# Patient Record
Sex: MALE | Race: WHITE | NOT HISPANIC OR LATINO | Employment: OTHER | URBAN - METROPOLITAN AREA
[De-identification: names, ages, dates, MRNs, and addresses within clinical notes are randomized per-mention and may not be internally consistent; named-entity substitution may affect disease eponyms.]

---

## 2019-07-02 ENCOUNTER — HISTORICAL (OUTPATIENT)
Dept: PREADMISSION TESTING | Facility: HOSPITAL | Age: 64
End: 2019-07-02

## 2019-07-02 LAB
ABS NEUT (OLG): 3.98 X10(3)/MCL (ref 2.1–9.2)
ALBUMIN SERPL-MCNC: 4.2 GM/DL (ref 3.4–5)
ALBUMIN/GLOB SERPL: 1.1 RATIO (ref 1.1–2)
ALP SERPL-CCNC: 77 UNIT/L (ref 50–136)
ALT SERPL-CCNC: 29 UNIT/L (ref 12–78)
APTT PPP: 40.7 SECOND(S) (ref 24.2–33.9)
AST SERPL-CCNC: 22 UNIT/L (ref 15–37)
BASOPHILS # BLD AUTO: 0.1 X10(3)/MCL (ref 0–0.2)
BASOPHILS NFR BLD AUTO: 2 %
BILIRUB SERPL-MCNC: 0.5 MG/DL (ref 0.2–1)
BILIRUBIN DIRECT+TOT PNL SERPL-MCNC: 0.2 MG/DL (ref 0–0.5)
BILIRUBIN DIRECT+TOT PNL SERPL-MCNC: 0.3 MG/DL (ref 0–0.8)
BUN SERPL-MCNC: 19 MG/DL (ref 7–18)
CALCIUM SERPL-MCNC: 9.3 MG/DL (ref 8.5–10.1)
CHLORIDE SERPL-SCNC: 103 MMOL/L (ref 98–107)
CO2 SERPL-SCNC: 30 MMOL/L (ref 21–32)
CREAT SERPL-MCNC: 1.19 MG/DL (ref 0.7–1.3)
EOSINOPHIL # BLD AUTO: 0.4 X10(3)/MCL (ref 0–0.9)
EOSINOPHIL NFR BLD AUTO: 6 %
ERYTHROCYTE [DISTWIDTH] IN BLOOD BY AUTOMATED COUNT: 13.6 % (ref 11.5–17)
GLOBULIN SER-MCNC: 3.7 GM/DL (ref 2.4–3.5)
GLUCOSE SERPL-MCNC: 87 MG/DL (ref 74–106)
HCT VFR BLD AUTO: 46.3 % (ref 42–52)
HGB BLD-MCNC: 14.6 GM/DL (ref 14–18)
INR PPP: 1.8 (ref 0–1.3)
LYMPHOCYTES # BLD AUTO: 1.4 X10(3)/MCL (ref 0.6–4.6)
LYMPHOCYTES NFR BLD AUTO: 20 %
MCH RBC QN AUTO: 27.7 PG (ref 27–31)
MCHC RBC AUTO-ENTMCNC: 31.5 GM/DL (ref 33–36)
MCV RBC AUTO: 87.7 FL (ref 80–94)
MONOCYTES # BLD AUTO: 0.9 X10(3)/MCL (ref 0.1–1.3)
MONOCYTES NFR BLD AUTO: 13 %
NEUTROPHILS # BLD AUTO: 3.98 X10(3)/MCL (ref 2.1–9.2)
NEUTROPHILS NFR BLD AUTO: 59 %
PLATELET # BLD AUTO: 171 X10(3)/MCL (ref 130–400)
PMV BLD AUTO: 11.6 FL (ref 9.4–12.4)
POTASSIUM SERPL-SCNC: 4.8 MMOL/L (ref 3.5–5.1)
PROT SERPL-MCNC: 7.9 GM/DL (ref 6.4–8.2)
PROTHROMBIN TIME: 21.4 SECOND(S) (ref 12–14)
RBC # BLD AUTO: 5.28 X10(6)/MCL (ref 4.7–6.1)
SODIUM SERPL-SCNC: 138 MMOL/L (ref 136–145)
WBC # SPEC AUTO: 6.8 X10(3)/MCL (ref 4.5–11.5)

## 2019-07-10 ENCOUNTER — HISTORICAL (OUTPATIENT)
Dept: ADMINISTRATIVE | Facility: HOSPITAL | Age: 64
End: 2019-07-10

## 2019-07-10 ENCOUNTER — HISTORICAL (OUTPATIENT)
Dept: PREADMISSION TESTING | Facility: HOSPITAL | Age: 64
End: 2019-07-10

## 2019-07-10 LAB
INR PPP: 1.1 (ref 0–1.3)
PROTHROMBIN TIME: 14.2 SECOND(S) (ref 12–14)

## 2020-07-27 LAB
ABS NEUT (OLG): 4.02 X10(3)/MCL (ref 2.1–9.2)
BASOPHILS # BLD AUTO: 0.1 X10(3)/MCL (ref 0–0.2)
BASOPHILS NFR BLD AUTO: 1 %
EOSINOPHIL # BLD AUTO: 0.5 X10(3)/MCL (ref 0–0.9)
EOSINOPHIL NFR BLD AUTO: 8 %
ERYTHROCYTE [DISTWIDTH] IN BLOOD BY AUTOMATED COUNT: 12.8 % (ref 11.5–17)
HCT VFR BLD AUTO: 45.8 % (ref 42–52)
HGB BLD-MCNC: 14.7 GM/DL (ref 14–18)
LYMPHOCYTES # BLD AUTO: 1.3 X10(3)/MCL (ref 0.6–4.6)
LYMPHOCYTES NFR BLD AUTO: 20 %
MCH RBC QN AUTO: 28.2 PG (ref 27–31)
MCHC RBC AUTO-ENTMCNC: 32.1 GM/DL (ref 33–36)
MCV RBC AUTO: 87.9 FL (ref 80–94)
MONOCYTES # BLD AUTO: 0.6 X10(3)/MCL (ref 0.1–1.3)
MONOCYTES NFR BLD AUTO: 10 %
NEUTROPHILS # BLD AUTO: 4.02 X10(3)/MCL (ref 2.1–9.2)
NEUTROPHILS NFR BLD AUTO: 61 %
PLATELET # BLD AUTO: 156 X10(3)/MCL (ref 130–400)
PMV BLD AUTO: 11.3 FL (ref 9.4–12.4)
RBC # BLD AUTO: 5.21 X10(6)/MCL (ref 4.7–6.1)
WBC # SPEC AUTO: 6.6 X10(3)/MCL (ref 4.5–11.5)

## 2020-07-30 ENCOUNTER — HISTORICAL (OUTPATIENT)
Dept: SURGERY | Facility: HOSPITAL | Age: 65
End: 2020-07-30

## 2020-07-30 LAB
APTT PPP: 32.3 SECOND(S) (ref 23.2–33.7)
INR PPP: 1.2 (ref 0–1.3)
PROTHROMBIN TIME: 14.5 SECOND(S) (ref 11.1–13.7)

## 2021-03-29 ENCOUNTER — HISTORICAL (OUTPATIENT)
Dept: ADMINISTRATIVE | Facility: HOSPITAL | Age: 66
End: 2021-03-29

## 2021-04-29 ENCOUNTER — HISTORICAL (OUTPATIENT)
Dept: ADMINISTRATIVE | Facility: HOSPITAL | Age: 66
End: 2021-04-29

## 2021-06-07 ENCOUNTER — HISTORICAL (OUTPATIENT)
Dept: ADMINISTRATIVE | Facility: HOSPITAL | Age: 66
End: 2021-06-07

## 2021-07-29 ENCOUNTER — HISTORICAL (OUTPATIENT)
Dept: ADMINISTRATIVE | Facility: HOSPITAL | Age: 66
End: 2021-07-29

## 2021-10-07 ENCOUNTER — HISTORICAL (OUTPATIENT)
Dept: ADMINISTRATIVE | Facility: HOSPITAL | Age: 66
End: 2021-10-07

## 2022-04-10 ENCOUNTER — HISTORICAL (OUTPATIENT)
Dept: ADMINISTRATIVE | Facility: HOSPITAL | Age: 67
End: 2022-04-10
Payer: MEDICARE

## 2022-04-27 VITALS
WEIGHT: 240.06 LBS | SYSTOLIC BLOOD PRESSURE: 100 MMHG | BODY MASS INDEX: 33.61 KG/M2 | DIASTOLIC BLOOD PRESSURE: 68 MMHG | HEIGHT: 71 IN

## 2022-04-30 NOTE — OP NOTE
DATE OF SURGERY:    07/30/2020    SURGEON:  Fady Avalos MD    PREOPERATIVE DIAGNOSIS:  Penile pain with inflatable penile prosthesis.    POSTOPERATIVE DIAGNOSIS:  Penile pain with inflatable penile prosthesis.    PROCEDURE:    1. Removal of 3-piece inflatable penile prosthesis.    2. Placement of inflatable penile prosthesis with Coloplast Titan device.    FINDINGS:  His penile prosthesis was removed and there was no obvious infection.  I did find that the left cylinder had migrated proximally, and down proximally, I could not feel bone with my measuring device, making me think there could have been possibly a proximal perforation that caused pain with inflation.    DESCRIPTION OF PROCEDURE:  After informed consent was obtained, the patient was taken to the operating room.  After receiving preoperative doses of vancomycin gentamicin, he was given a general anesthetic in the supine position.  His abdomen, genitals, perineum, and lower extremities above the knees were all prepped and draped in the usual sterile fashion.  I placed a 16-Australian Monzon catheter through the urethra into the bladder.  I then used a Michael deep scrotal retractor to place tension on the penis.  I used a #15 blade to make a horizontal penoscrotal incision through the previous scar.  I divided the scrotal layers down to the corporal bodies as well as to expose the urethra to prevent any injury.  I dissected the pump out of the pseudocapsule space in the scrotum.  I followed the tubing to the cylinders and the corpora.  Using the cutting current on the cautery, I made corporotomies just above the tubing to the cylinders on both sides.  When this was completed, I placed stay sutures of 2-0 Vicryl on each side into the corpora.  I used a vein retractor to remove the cylinders and the rear-tip extenders on both sides.  I then drained the bladder through the catheter with the aid of the suction device.  I then followed the tubing to the  reservoir space in the left retropubic location.  I removed that and I placed a Yankauer sucker down the pseudocapsule space and washed this with half-strength peroxide, Betadine, and concentrated rifampin and gentamicin.  Using the measuring device, I measured his corpora.  The right side measured 10 cm distally and 14 cm proximally.  On the left side, he measured 10 cm distally and proximally, I could never really find bone.  It seemed to keep going, making me think he had had maybe a perforation or a proximal erosion which caused pain with inflation of his previous device.  I decided to do a windsock repair on that side.  I chose 22 cm Titan cylinders that were appropriately prepped on the back table and soaked in concentrated rifampin and gentamicin.  I placed 2 cm rear-tip extenders on both ends.  On the left side, I placed a 2-0 Prolene stitch x2 on each side of the rear tip extenders for my windsock repair.  Using the Oseas device, I used a needle to pass the strings through the distal corpora and glans on both sides.  I placed both proximal ends into position and pulled the distal ends into position with the strings.  I used the previously placed stay sutures in the corporotomies to close the corporotomies.  Just prior to this on the left side, I brought both ends of the Prolene stitches from my windsock repair through the corpora on both sides.  I held my strings to the distal ends of the cylinders tight and I pumped up both of the cylinders.  I closed the corpora with the Vicryl stitches.  For my windsock repair, I closed my Prolene stitches separately, and then pulled both of them over the corporotomies and I tied them together for a nice hold.  Both tips of the cylinders were very close to the same location.  I then deflated the devices, the retractor was removed.  I then prepped a 75 cc reservoir on the back table.  I soaked it in concentrated rifampin and gentamicin.  I then used a long nasal speculum  to perforate the transversalis fascia above the right pubic tubercle and I made a retroperitoneal space with the speculum and I placed the 75 cc reservoir in that location on the right side.  I then filled it with 75 cc of isotonic saline.  Then using the quick connectors, I connected the reservoir to the pump, which was placed in the left hemiscrotum because he had a previous left orchiectomy.  I then placed concentrated rifampin and washed out the pump and placed some in the scrotum.  I then closed the scrotum in multiple layers using a running 2-0 Vicryl stitch for the dartos fascia, followed by a running 4-0 Monocryl subcuticular stitch for the skin.  He tolerated the procedure well.  The estimated blood loss was 25 cc.  There were no apparent complications.  He was extubated and brought to recovery room in good condition.        ______________________________  MD JL Chaney/QUIN  DD:  07/30/2020  Time:  06:20PM  DT:  07/30/2020  Time:  06:46PM  Job #:  [id number]

## 2022-04-30 NOTE — OP NOTE
DATE OF SURGERY:        SURGEON:  Ag Aden MD    PREOP DIAGNOSIS:  4 x 2 cm basal cell carcinoma of the left neck.    POSTOP DIAGNOSIS:  4 x 2 cm basal cell carcinoma of the left neck.    PROCEDURE:  Wide local excision of basal cell carcinoma of the left neck with primary closure.     __________    ANESTHESIA:  General, IV sedation.    COMPLICATIONS:  None.    CONDITION:  Stable.    HISTORY OF PRESENT ILLNESS:  63-year-old gentleman well known to me.  We biopsied a lesion on his left neck.  It was positive for basal cell carcinoma.  All risks and benefits were explained to the patient in detail and informed consent was obtained prior to proceeding.    PROCEDURE IN DETAIL:  The patient was brought to the operating room and placed supine position.  Once IV sedation had been administered, I injected the area with 10 cc 1% lidocaine with epinephrine.  The patient was prepped, draped in sterile fashion.  I marked at approximately a 5 mm margin.  The entire lesion was incised using a 15 blade and scissors.  Sent for frozen section.  All margins were noted to be negative.  I undermined the defect widely using 4-0 Vicryl to close the deep dermal layer and then closed the skin using a 4-0 chromic in a simple running fashion.  The patient was awoken and brought to recovery room without complications.        ______________________________  Ag Aden MD JD/SC  DD:  07/10/2019  Time:  01:38PM  DT:  07/10/2019  Time:  01:52PM  Job #:  [id number]

## 2022-05-03 NOTE — HISTORICAL [facility] CERNER
This is a historical note converted from Aron. Formatting and pictures may have been removed.  Please reference Aron for original formatting and attached multimedia. Chief Complaint  pt here for f/u R TKA 7/13/21 - 10/11/21 pt reports his knee had not been doing well. he states he fell last week directly on his knee and since has been having problems with it.  History of Present Illness  Patient comes in today follow-up for right total knee replacement with Dr. Hull.  He reports he is doing well until last week he sustained a fall pain he is describing an incident he is not quite sure how his knee was?involved in a fall, he describes it being jammed and then he states he hit the ground and rolled on the knee.  ?  He states his knee has been giving him more pain since the fall and he did not call the office in?talk to anyone until he showed up today for his regular scheduled follow-up appointment.  ?  patient is on coumadin  Review of Systems  Constitutional: No fever, No chills.  Respiratory: No shortness of breath, No cough.  Cardiovascular: No chest pain.  Gastrointestinal: No nausea, No vomiting, No diarrhea, No constipation, No heartburn.  Genitourinary: No dysuria, No hematuria.  Hematology/Lymphatics: No bleeding tendency.  Endocrine: No polyuria.  Neurologic: Alert and oriented X4, No numbness, No tingling.  Psychiatric: No anxiety, No depression.  Integumentary: ?negative except as documented in history of present illness  Physical Exam  Vitals & Measurements  T:?36.9? ?C (Oral)? HR:?65(Peripheral)? BP:?112/67?  HT:?180.00?cm? WT:?108.900?kg? BMI:?33.61?  Right knee exam  Wound healed well without s/s infection  ROM 0-110  Walking with altered gait  Mild/moderate effusion  CMS intact to the right lower extremity  Tenderness to palpation over the quad tendon?at the insertion of the patella.  Patient does have some pain with patella mobilization.  He is able to?perform straight leg raise without  difficulty  He is able to extend the leg against resistance  There is no palpable defect felt of the quad or patella tendon  ? ?  x-rays show intact prosthesis in good position  No acute bony abnormalities noted  Patella well centered in the femoral sulcus  ???  Diagnosis: Status post right total knee arthroplasty  Right knee?contusion  ???  Plan:  At this point the patient will hold his physical therapy.  ?ice treatment to the knee.  He cannot use anti-inflammatories due to his Coumadin?anticoagulation medication.  We will have the patient follow-up with Dr. Araujo next week for repeat evaluation.  Recommend the patient use a cane or other assistive device for ambulation of the meantime  Follow-up in _  Assessment/Plan  1.?Contusion of right knee?S80.01XA  2.?Status post total right knee replacement?Z96.651   Problem List/Past Medical History  Ongoing  Acid reflux  Arthritis  Cardiac pacemaker  Contusion of right knee  Erectile dysfunction  Heart murmur  HLD - Hyperlipidemia  HTN - Hypertension  Paroxysmal atrial fibrillation  Penile pain  Sleep apnea  Status post total right knee replacement  Historical  Hypertension  Procedure/Surgical History  CODEY MARSHALL Total Knee Arthroplasty (Right) (07/13/2021)  Replacement of Right Knee Joint with Synthetic Substitute, Uncemented, Open Approach (07/13/2021)  Robotic Assisted Procedure of Lower Extremity, Open Approach (07/13/2021)  Change Drainage Device in Bladder, External Approach (05/30/2021)  Penile Implant (None) (07/30/2020)  Removal and replacement of all component(s) of a multi-component, inflatable penile prosthesis at the same operative session (07/30/2020)  Removal of Synthetic Substitute from Penis, Open Approach (07/30/2020)  Removal Penile Implant (None) (07/30/2020)  Supplement Penis with Synthetic Substitute, Open Approach (07/30/2020)  penile implant pump exchange with orchiectomy (10.2019)  48005 - EXC MAL LESION 3.1-4cm SNG W/ H&F (Left) (07/10/2019)  25905  - REP INTMD WOUND(S) [facility] 2.6-7.5cm (Left) (07/10/2019)  Excision of Left Neck Subcutaneous Tissue and Fascia, Open Approach (07/10/2019)  Excision, malignant lesion including margins, scalp, neck, hands, feet, genitalia; excised diameter 3.1 to 4.0 cm (07/10/2019)  Repair Left Neck Subcutaneous Tissue and Fascia, Open Approach (07/10/2019)  Repair, intermediate, wounds of neck, hands, feet and/or external genitalia; 2.6 cm to 7.5 cm (07/10/2019)  penile implant (04/18/2019)  Rt CW pacemaker (2016)  Angioplasty  Bilateral ulner nerve decompression  carpal tunnel  cervical decompression  Cholecystectomy  left shoulder bone spur  Left TKR  mitral valve repair  Open heart surgery  ORIF Rt ankle  right index finger   Medications  acetaminophen-oxycodone 325 mg-5 mg oral tablet, 1 tab(s), Oral, q6hr,? ?Not Taking, Completed Rx  aspirin 81 mg oral Delayed Release (EC) tablet, 81 mg= 1 tab(s), Oral, Daily  FUROSEMIDE 40 MG TABLET, 40 mg= 1 tab(s), Oral, Daily  GABAPENTIN 400 MG CAPSULE, 400 mg= 1 cap(s), Oral, BID  LISINOPRIL 2.5 MG TABLET, 2.5 mg= 1 tab(s), Oral, BID  Lovenox 100mg Inj, 100 mg, Subcutaneous, q12hr,? ?Still taking, not as prescribed: Took 1 dose Saturday and Sunday, Instructed to take 1 dose thursday and friday and follow up with Coumadin clinic on Friday  methocarbamol 750 mg oral tablet, 1500 mg= 2 tab(s), Oral, TID  METOPROLOL SUCC ER 50 MG TAB, 50 mg= 1 tab(s), Oral, Daily  Percocet 5/325 oral tablet, 1 tab(s), Oral, q8hr, PRN,? ?Not Taking, Completed Rx  PRAVASTATIN SODIUM 10 MG TAB, 10 mg= 1 tab(s), Oral, qPM  TAMSULOSIN HCL 0.4 MG CAPSULE, 0.4 mg= 1 cap(s), Oral, BID  warfarin 3 mg oral tablet, 6 mg= 2 tab(s), Oral, Daily  Allergies  Adhesive Bandage?(Rash)  statins?(elevated enzymes)  Social History  Abuse/Neglect  No, No, Yes, 10/07/2021  Alcohol - Denies Alcohol Use, 08/01/2013  1-2 times per week, 05/30/2021  Current, Beer, 1-2 times per week, 07/30/2020  Employment/School  Disabled, Highest  education level: High school., 07/30/2020  Spiritual/Cultural  Buddhist, 07/28/2020  Substance Use - Denies Substance Abuse, 08/01/2013  Never, 04/27/2018  Tobacco - Denies Tobacco Use, 08/01/2013  Former smoker, quit more than 30 days ago, N/A, 10/07/2021  Immunizations  Vaccine Date Status   COVID-19 MRNA, LNP-S, PF- Pfizer 05/13/2021 Recorded   COVID-19 MRNA, LNP-S, PF- Pfizer 04/22/2021 Recorded   Health Maintenance  Health Maintenance  ???Pending?(in the next year)  ??? ??Due?  ??? ? ? ?Colorectal Screening due??10/08/21??Unknown Frequency  ??? ? ? ?Hypertension Management-Education due??10/08/21??and every 1??year(s)  ??? ? ? ?Medicare Annual Wellness Exam due??10/08/21??and every 1??year(s)  ??? ? ? ?Tetanus Vaccine due??10/08/21??and every 10??year(s)  ??? ? ? ?Zoster Vaccine due??10/08/21??Unknown Frequency  ??? ??Due In Future?  ??? ? ? ?Obesity Screening not due until??01/01/22??and every 1??year(s)  ??? ? ? ?Alcohol Misuse Screening not due until??01/02/22??and every 1??year(s)  ??? ? ? ?ADL Screening not due until??03/29/22??and every 1??year(s)  ??? ? ? ?Aspirin Therapy for CVD Prevention not due until??05/31/22??and every 1??year(s)  ??? ? ? ?Hypertension Management-BMP not due until??07/15/22??and every 1??year(s)  ??? ? ? ?Blood Pressure Screening not due until??10/07/22??and every 1??year(s)  ??? ? ? ?Body Mass Index Check not due until??10/07/22??and every 1??year(s)  ??? ? ? ?Depression Screening not due until??10/07/22??and every 1??year(s)  ??? ? ? ?Hypertension Management-Blood Pressure not due until??10/07/22??and every 1??year(s)  ???Satisfied?(in the past 1 year)  ??? ??Satisfied?  ??? ? ? ?ADL Screening on??03/29/21.??Satisfied by Fernando Mack  ??? ? ? ?Alcohol Misuse Screening on??03/29/21.??Satisfied by Fernando Mack  ??? ? ? ?Aspirin Therapy for CVD Prevention on??05/31/21.  ??? ? ? ?Blood Pressure Screening on??10/07/21.??Satisfied by Rula Marks  ??? ? ?  ?Body Mass Index Check on??10/07/21.??Satisfied by Rula Marks  ??? ? ? ?Depression Screening on??10/07/21.??Satisfied by Rula Marks  ??? ? ? ?Diabetes Screening on??07/15/21.??Satisfied by Evonne Givens  ??? ? ? ?Hypertension Management-Blood Pressure on??10/07/21.??Satisfied by Rula Marks  ??? ? ? ?Influenza Vaccine on??10/07/21.??Satisfied by Rula Marks  ??? ? ? ?Obesity Screening on??10/07/21.??Satisfied by Rula Marks  ?

## 2022-05-03 NOTE — HISTORICAL [facility] CERNER
This is a historical note converted from Aron. Formatting and pictures may have been removed.  Please reference Aron for original formatting and attached multimedia. Chief Complaint  Here for R knee pain x 1 year, pt states the pain is getting worse, not taking anything for pain, xr today  History of Present Illness  Patient comes in today complaining of worsening right knee pain for last year.? He has had a previous left total knee arthroplasty?over 9 years ago. ?He states his right knee is just getting worse.? He has pain with daily activities walking and bending. ?He has tried rest medication without relief as well.? He is present here with family.? He does have a previous history of a distal tibia fracture, treated intramedullary nail, since removed.  Physical Exam  Vitals & Measurements  T:?36.7? ?C (Oral)? HR:?65(Peripheral)? BP:?136/84?  HT:?180.00?cm? WT:?106.700?kg? BMI:?32.93?  Patient is well-nourished well-developed male he is awake alert and oriented x3 he is in apparent stress he is pleasant and cooperative. ?Examination of the?right lower extremity compartments are soft and warm. ?Skin is intact. ?There is no signs or symptoms of DVT or infection. ?He is tender on the medial lateral joint line positive patella grind negative apprehension.? His previous incision over the anterior knee is well-healed. ?He does have patellofemoral crepitance, palpable.? His motion is 5 to 105 degrees. ?He is stable to stressing he is neurovascular tact distally.? X-rays 3 views right knee demonstrate degenerative changes.  Assessment/Plan  1.?Osteoarthritis of right knee?M17.11  ?At this time we discussed his physical exam and x-ray findings. ?We have discussed his underlying arthritis. ?We have discussed his previous surgeries.? We have discussed conservative and surgical invention. ?Under sterile technique he tolerated a steroid injection very well for the anterolateral part of his right knee. ?This was 2 cc  dexamethasone 3 cc of 2% lidocaine without. ?He tolerated this very well.? We have also discussed the total joint class. ?I would like to see him back in 4 weeks to see how he is progressing. ?He will watch his blood sugars?as well.  Ordered:  asp/inj jnt/bursa, major [id number] PC, 03/29/21 14:06:00 CDT, [facility], Routine, 03/29/21 14:06:00 CDT, Osteoarthritis of right knee  History of fracture of tibia  History of total left knee replacement  Clinic Follow up, *Est. 04/26/21 3:00:00 CDT, Order for future visit, Osteoarthritis of right knee  History of fracture of tibia  History of total left knee replacement, Orthopaedics  Office/Outpatient Visit Level 4 New [id number] PC, Osteoarthritis of right knee  History of fracture of tibia  History of total left knee replacement, [facility], 03/29/21 14:06:00 CDT  ?  2.?History of fracture of tibia?Z87.81  Ordered:  asp/inj jnt/bursa, major [id number] PC, 03/29/21 14:06:00 CDT, [facility], Routine, 03/29/21 14:06:00 CDT, Osteoarthritis of right knee  History of fracture of tibia  History of total left knee replacement  Clinic Follow up, *Est. 04/26/21 3:00:00 CDT, Order for future visit, Osteoarthritis of right knee  History of fracture of tibia  History of total left knee replacement, [facility]  Office/Outpatient Visit Level 4 New [id number] PC, Osteoarthritis of right knee  History of fracture of tibia  History of total left knee replacement, [facility], 03/29/21 14:06:00 CDT  ?  3.?History of total left knee replacement?Z96.652  Ordered:  asp/inj jnt/bursa, major [id number] PC, 03/29/21 14:06:00 CDT, [facility], Routine, 03/29/21 14:06:00 CDT, Osteoarthritis of right knee  History of fracture of tibia  History of total left knee replacement  Clinic Follow up, *Est. 04/26/21 3:00:00 CDT, Order for future visit, Osteoarthritis of right knee  History of fracture of tibia  History of total left knee replacement,  [facility]  Office/Outpatient Visit Level 4 New [id number] PC, Osteoarthritis of right knee  History of fracture of tibia  History of total left knee replacement, [facility], 03/29/21 14:06:00 CDT  ?  Referrals  Clinic Follow up, *Est. 04/26/21 3:00:00 CDT, Order for future visit, Osteoarthritis of right knee  History of fracture of tibia  History of total left knee replacement, [facility]   Problem List/Past Medical History  Ongoing  Acid reflux  Arthritis  Cardiac pacemaker  Erectile dysfunction  Heart murmur  HLD - Hyperlipidemia  HTN - Hypertension  Paroxysmal atrial fibrillation  Penile pain  Sleep apnea  Historical  No qualifying data  Procedure/Surgical History  Penile Implant (None) (07/30/2020)  Removal and replacement of all component(s) of a multi-component, inflatable penile prosthesis at the same operative session (07/30/2020)  Removal of Synthetic Substitute from Penis, Open Approach (07/30/2020)  Removal Penile Implant (None) (07/30/2020)  Supplement Penis with Synthetic Substitute, Open Approach (07/30/2020)  penile implant pump exchange with orchiectomy (10.2019)  31611 - EXC MAL LESION 3.1-4cm SNG W/ H&F (Left) (07/10/2019)  72326 - REP INTMD WOUND(S) [facility] 2.6-7.5cm (Left) (07/10/2019)  Excision of Left Neck Subcutaneous Tissue and Fascia, Open Approach (07/10/2019)  Excision, malignant lesion including margins, scalp, neck, hands, feet, genitalia; excised diameter 3.1 to 4.0 cm (07/10/2019)  Repair Left Neck Subcutaneous Tissue and Fascia, Open Approach (07/10/2019)  Repair, intermediate, wounds of neck, hands, feet and/or external genitalia; 2.6 cm to 7.5 cm (07/10/2019)  penile implant (04/18/2019)  Angioplasty  Bilateral ulner nerve decompression  carpal tunnel  cervical decompression  Cholecystectomy  left shoulder bone spur  Left TKR  mitral valve repair  Open heart surgery  right index finger  Right leg   Medications  aspirin 81 mg oral tablet, 81 mg= 1 tab(s), Oral,  Daily  cyclobenzaprine 10 mg oral tablet, 10 mg= 1 tab(s), Oral, TID, PRN  FUROSEMIDE 40 MG TABLET, 40 mg= 1 tab(s), Oral, Daily  GABAPENTIN 400 MG CAPSULE, 400 mg= 1 cap(s), Oral, TID  LISINOPRIL 2.5 MG TABLET, 2.5 mg= 1 tab(s), Oral, BID  Lovenox 100mg Inj, 100 mg, Subcutaneous, q12hr,? ?Not Taking, Completed Rx  meloxicam 7.5 mg oral tablet, 7.5 mg= 1 tab(s), Oral, Daily  METOPROLOL SUCC ER 50 MG TAB, 50 mg= 1 tab(s), Oral, Daily  PRAVASTATIN SODIUM 10 MG TAB, 10 mg= 1 tab(s), Oral, qPM  PREDNISONE 20 MG TABLET, 20 mg= 1 tab(s), Oral, Daily,? ?Not Taking, Completed Rx: last dose 2 weeks ago  TAMSULOSIN HCL 0.4 MG CAPSULE, 0.4 mg= 1 cap(s), Oral, Daily  WARFARIN SODIUM 3 MG TABLET, 3 mg= 1 tab(s), Oral, Daily  Allergies  Adhesive Bandage?(Rash)  statins?(elevated enzymes)  Social History  Abuse/Neglect  No, 03/29/2021  Alcohol - Denies Alcohol Use, 08/01/2013  Current, Beer, 1-2 times per week, 07/30/2020  Never, 04/27/2018  Employment/School  Disabled, Highest education level: High school., 07/30/2020  Spiritual/Cultural  Anabaptist, 07/28/2020  Substance Use - Denies Substance Abuse, 08/01/2013  Never, 04/27/2018  Tobacco - Denies Tobacco Use, 08/01/2013  Former smoker, quit more than 30 days ago, N/A, 03/29/2021  Health Maintenance  Health Maintenance  ???Pending?(in the next year)  ??? ??OverDue  ??? ? ? ?Aspirin Therapy for CVD Prevention due??07/03/20??and every 1??year(s)  ??? ??Due?  ??? ? ? ?Hypertension Management-Education due??03/29/21??and every 1??year(s)  ??? ? ? ?Medicare Annual Wellness Exam due??03/29/21??and every 1??year(s)  ??? ? ? ?Tetanus Vaccine due??03/29/21??and every 10??year(s)  ??? ??Due In Future?  ??? ? ? ?Obesity Screening not due until??01/01/22??and every 1??year(s)  ??? ? ? ?Alcohol Misuse Screening not due until??01/02/22??and every 1??year(s)  ???Satisfied?(in the past 1 year)  ??? ??Satisfied?  ??? ? ? ?ADL Screening on??03/29/21.??Satisfied by Fernando Mack  ??? ? ?  ?Alcohol Misuse Screening on??03/29/21.??Satisfied by Fernando Mack  ??? ? ? ?Blood Pressure Screening on??03/29/21.??Satisfied by Fernando Mack  ??? ? ? ?Body Mass Index Check on??03/29/21.??Satisfied by Fernando Mack  ??? ? ? ?Depression Screening on??03/29/21.??Satisfied by Fernando Mack  ??? ? ? ?Hypertension Management-Blood Pressure on??03/29/21.??Satisfied by Fernando Mack  ??? ? ? ?Influenza Vaccine on??03/29/21.??Satisfied by Fernando Mack  ??? ? ? ?Obesity Screening on??03/29/21.??Satisfied by Fernando Mack  ?

## 2022-05-03 NOTE — HISTORICAL [facility]
This is a historical note converted from Aron. Formatting and pictures may have been removed.  Please reference Aron for original formatting and attached multimedia. Chief Complaint  2W POSTOP R TKA 7/13/21- GL 10/11/21- pt is ambulating with a walker - he is wearing his denise hoses and knee is wrapped with ace wrap -- tD  History of Present Illness  Patient returns to clinic?s/p?R TKA 7/13/21. He is approximately 2 weeks out. He is very pleased with his?surgical outcome. Attending PT. Minimal pain today. Not utilizing pain meds. Ambulating with walker.?No new complaints.  ?  Physical Exam  Vitals & Measurements  HT:?180.00?cm? WT:?108.900?kg? BMI:?33.61?  Right lower compartments soft and warm. No s/s of DVT or infection.?Incisions healing well without s/s infection. ROM?5 to 100. Walking with altered gait utilizing walker. Mild swelling to the knee. CMS intact to the?right lower extremity. Neurovascularly intact distally.  ???  x-rays: 3 views of?right knee?show intact prosthesis in good position without signs of loosening or infection  Assessment/Plan  1.?History of total right knee replacement ? Z96.651  ?Reviewed the patients physical exam findings and?x-rays. The patient is doing very well. ROM improving and pain decreasing. Cont. PT to improve strength and motion; wean walker when ready. Staples taken out in clinic. Gave wound care instructions.??Id like to see the patient back?in 4 weeks?to assess progression.  Referrals  Clinic Follow up, *Est. 08/26/21 3:00:00 CDT, Order for future visit, History of total right knee replacement, [facility]   Problem List/Past Medical History  Ongoing  Acid reflux  Arthritis  Cardiac pacemaker  Erectile dysfunction  Heart murmur  HLD - Hyperlipidemia  HTN - Hypertension  Paroxysmal atrial fibrillation  Penile pain  Sleep apnea  Historical  Hypertension  Procedure/Surgical History  CODEY MARSHALL Total Knee Arthroplasty (Right) (07/13/2021)  Replacement of Right Knee Joint  with Synthetic Substitute, Uncemented, Open Approach (07/13/2021)  Robotic Assisted Procedure of Lower Extremity, Open Approach (07/13/2021)  Change Drainage Device in Bladder, External Approach (05/30/2021)  Penile Implant (None) (07/30/2020)  Removal and replacement of all component(s) of a multi-component, inflatable penile prosthesis at the same operative session (07/30/2020)  Removal of Synthetic Substitute from Penis, Open Approach (07/30/2020)  Removal Penile Implant (None) (07/30/2020)  Supplement Penis with Synthetic Substitute, Open Approach (07/30/2020)  penile implant pump exchange with orchiectomy (10.2019)  65752 - EXC MAL LESION 3.1-4cm SNG W/ H&F (Left) (07/10/2019)  98223 - REP INTMD WOUND(S) NHFG 2.6-7.5cm (Left) (07/10/2019)  Excision of Left Neck Subcutaneous Tissue and Fascia, Open Approach (07/10/2019)  Excision, malignant lesion including margins, scalp, neck, hands, feet, genitalia; excised diameter 3.1 to 4.0 cm (07/10/2019)  Repair Left Neck Subcutaneous Tissue and Fascia, Open Approach (07/10/2019)  Repair, intermediate, wounds of neck, hands, feet and/or external genitalia; 2.6 cm to 7.5 cm (07/10/2019)  penile implant (04/18/2019)  Rt CW pacemaker (2016)  Angioplasty  Bilateral ulner nerve decompression  carpal tunnel  cervical decompression  Cholecystectomy  left shoulder bone spur  Left TKR  mitral valve repair  Open heart surgery  ORIF Rt ankle  right index finger   Medications  acetaminophen-oxycodone 325 mg-5 mg oral tablet, 1 tab(s), Oral, q6hr,? ?Not Taking, Completed Rx  aspirin 81 mg oral Delayed Release (EC) tablet, 81 mg= 1 tab(s), Oral, Daily  FUROSEMIDE 40 MG TABLET, 40 mg= 1 tab(s), Oral, Daily  GABAPENTIN 400 MG CAPSULE, 400 mg= 1 cap(s), Oral, BID  LISINOPRIL 2.5 MG TABLET, 2.5 mg= 1 tab(s), Oral, BID  Lovenox 100mg Inj, 100 mg, Subcutaneous, q12hr,? ?Still taking, not as prescribed: Took 1 dose Saturday and Sunday, Instructed to take 1 dose thursday and friday and follow  up with Coumadin clinic on Friday  methocarbamol 750 mg oral tablet, 1500 mg= 2 tab(s), Oral, TID  METOPROLOL SUCC ER 50 MG TAB, 50 mg= 1 tab(s), Oral, Daily  Percocet 5/325 oral tablet, 1 tab(s), Oral, q8hr, PRN  PRAVASTATIN SODIUM 10 MG TAB, 10 mg= 1 tab(s), Oral, qPM  TAMSULOSIN HCL 0.4 MG CAPSULE, 0.4 mg= 1 cap(s), Oral, BID  warfarin 3 mg oral tablet, 6 mg= 2 tab(s), Oral, Daily  Allergies  Adhesive Bandage?(Rash)  statins?(elevated enzymes)  Social History  Abuse/Neglect  No, No, Yes, 07/29/2021  Alcohol - Denies Alcohol Use, 08/01/2013  1-2 times per week, 05/30/2021  Current, Beer, 1-2 times per week, 07/30/2020  Employment/School  Disabled, Highest education level: High school., 07/30/2020  Spiritual/Cultural  [employer], 07/28/2020  Substance Use - Denies Substance Abuse, 08/01/2013  Never, 04/27/2018  Tobacco - Denies Tobacco Use, 08/01/2013  Former smoker, quit more than 30 days ago, N/A, 07/29/2021  Immunizations  Vaccine Date Status   COVID-19 MRNA, LNP-S, PF- Pfizer 05/13/2021 Recorded   COVID-19 MRNA, LNP-S, PF- Pfizer 04/22/2021 Recorded   Health Maintenance  Health Maintenance  ???Pending?(in the next year)  ??? ??OverDue  ??? ? ? ?Influenza Vaccine due??10/01/20??and every 1??day(s)  ??? ??Due?  ??? ? ? ?Colorectal Screening due??07/29/21??Unknown Frequency  ??? ? ? ?Hypertension Management-Education due??07/29/21??and every 1??year(s)  ??? ? ? ?Medicare Annual Wellness Exam due??07/29/21??and every 1??year(s)  ??? ? ? ?Tetanus Vaccine due??07/29/21??and every 10??year(s)  ??? ? ? ?Zoster Vaccine due??07/29/21??Unknown Frequency  ??? ??Due In Future?  ??? ? ? ?Obesity Screening not due until??01/01/22??and every 1??year(s)  ??? ? ? ?Alcohol Misuse Screening not due until??01/02/22??and every 1??year(s)  ??? ? ? ?ADL Screening not due until??03/29/22??and every 1??year(s)  ??? ? ? ?Aspirin Therapy for CVD Prevention not due until??05/31/22??and every 1??year(s)  ??? ? ? ?Blood Pressure Screening not  due until??06/07/22??and every 1??year(s)  ??? ? ? ?Depression Screening not due until??06/07/22??and every 1??year(s)  ??? ? ? ?Hypertension Management-Blood Pressure not due until??06/07/22??and every 1??year(s)  ??? ? ? ?Hypertension Management-BMP not due until??07/15/22??and every 1??year(s)  ???Satisfied?(in the past 1 year)  ??? ??Satisfied?  ??? ? ? ?ADL Screening on??03/29/21.??Satisfied by Fernando Mack  ??? ? ? ?Alcohol Misuse Screening on??03/29/21.??Satisfied by Fernando Mack  ??? ? ? ?Aspirin Therapy for CVD Prevention on??05/31/21.  ??? ? ? ?Blood Pressure Screening on??07/15/21.??Satisfied by Rosibel Lechuga CNA  ??? ? ? ?Body Mass Index Check on??07/29/21.??Satisfied by Andreia Holguin  ??? ? ? ?Depression Screening on??06/07/21.??Satisfied by Fernando Mack  ??? ? ? ?Diabetes Screening on??07/15/21.??Satisfied by Evonne Givens  ??? ? ? ?Hypertension Management-Blood Pressure on??07/15/21.??Satisfied by Rosibel Lechuga CNA  ??? ? ? ?Influenza Vaccine on??03/29/21.??Satisfied by Fernando Mack  ??? ? ? ?Obesity Screening on??07/29/21.??Satisfied by Andreia Holguin  ?      Agree with above office note. ?Patient is healing nicely. ?He is happy with his results. ?He will continue physical therapy, would like to see him back in 4 weeks to see how he is progressing.

## 2022-07-11 ENCOUNTER — HOSPITAL ENCOUNTER (OUTPATIENT)
Dept: RADIOLOGY | Facility: CLINIC | Age: 67
Discharge: HOME | End: 2022-07-11
Attending: ORTHOPAEDIC SURGERY
Payer: MEDICARE

## 2022-07-11 ENCOUNTER — OFFICE VISIT (OUTPATIENT)
Dept: ORTHOPEDICS | Facility: CLINIC | Age: 67
End: 2022-07-11
Payer: MEDICARE

## 2022-07-11 DIAGNOSIS — Z47.1 AFTERCARE FOLLOWING RIGHT KNEE JOINT REPLACEMENT SURGERY: Primary | ICD-10-CM

## 2022-07-11 DIAGNOSIS — Z96.651 AFTERCARE FOLLOWING RIGHT KNEE JOINT REPLACEMENT SURGERY: ICD-10-CM

## 2022-07-11 DIAGNOSIS — Z96.651 HISTORY OF TOTAL KNEE REPLACEMENT, RIGHT: ICD-10-CM

## 2022-07-11 DIAGNOSIS — Z96.651 AFTERCARE FOLLOWING RIGHT KNEE JOINT REPLACEMENT SURGERY: Primary | ICD-10-CM

## 2022-07-11 DIAGNOSIS — Z47.1 AFTERCARE FOLLOWING RIGHT KNEE JOINT REPLACEMENT SURGERY: ICD-10-CM

## 2022-07-11 PROCEDURE — 4010F ACE/ARB THERAPY RXD/TAKEN: CPT | Mod: CPTII,,, | Performed by: ORTHOPAEDIC SURGERY

## 2022-07-11 PROCEDURE — 99213 PR OFFICE/OUTPT VISIT, EST, LEVL III, 20-29 MIN: ICD-10-PCS | Mod: ,,, | Performed by: ORTHOPAEDIC SURGERY

## 2022-07-11 PROCEDURE — 1159F MED LIST DOCD IN RCRD: CPT | Mod: CPTII,,, | Performed by: ORTHOPAEDIC SURGERY

## 2022-07-11 PROCEDURE — 1101F PR PT FALLS ASSESS DOC 0-1 FALLS W/OUT INJ PAST YR: ICD-10-PCS | Mod: CPTII,,, | Performed by: ORTHOPAEDIC SURGERY

## 2022-07-11 PROCEDURE — 3288F FALL RISK ASSESSMENT DOCD: CPT | Mod: CPTII,,, | Performed by: ORTHOPAEDIC SURGERY

## 2022-07-11 PROCEDURE — 1160F RVW MEDS BY RX/DR IN RCRD: CPT | Mod: CPTII,,, | Performed by: ORTHOPAEDIC SURGERY

## 2022-07-11 PROCEDURE — 73564 XR KNEE COMP 4 OR MORE VIEWS RIGHT: ICD-10-PCS | Mod: RT,,, | Performed by: ORTHOPAEDIC SURGERY

## 2022-07-11 PROCEDURE — 4010F PR ACE/ARB THEARPY RXD/TAKEN: ICD-10-PCS | Mod: CPTII,,, | Performed by: ORTHOPAEDIC SURGERY

## 2022-07-11 PROCEDURE — 1159F PR MEDICATION LIST DOCUMENTED IN MEDICAL RECORD: ICD-10-PCS | Mod: CPTII,,, | Performed by: ORTHOPAEDIC SURGERY

## 2022-07-11 PROCEDURE — 3288F PR FALLS RISK ASSESSMENT DOCUMENTED: ICD-10-PCS | Mod: CPTII,,, | Performed by: ORTHOPAEDIC SURGERY

## 2022-07-11 PROCEDURE — 1160F PR REVIEW ALL MEDS BY PRESCRIBER/CLIN PHARMACIST DOCUMENTED: ICD-10-PCS | Mod: CPTII,,, | Performed by: ORTHOPAEDIC SURGERY

## 2022-07-11 PROCEDURE — 1101F PT FALLS ASSESS-DOCD LE1/YR: CPT | Mod: CPTII,,, | Performed by: ORTHOPAEDIC SURGERY

## 2022-07-11 PROCEDURE — 73564 X-RAY EXAM KNEE 4 OR MORE: CPT | Mod: RT,,, | Performed by: ORTHOPAEDIC SURGERY

## 2022-07-11 PROCEDURE — 99213 OFFICE O/P EST LOW 20 MIN: CPT | Mod: ,,, | Performed by: ORTHOPAEDIC SURGERY

## 2022-07-11 RX ORDER — WARFARIN 3 MG/1
TABLET ORAL
COMMUNITY
Start: 2022-03-14

## 2022-07-11 RX ORDER — GABAPENTIN 400 MG/1
400 CAPSULE ORAL
COMMUNITY
Start: 2021-11-07

## 2022-07-11 RX ORDER — ASPIRIN 81 MG/1
81 TABLET ORAL
COMMUNITY

## 2022-07-11 RX ORDER — METOPROLOL SUCCINATE 50 MG/1
50 TABLET, EXTENDED RELEASE ORAL
COMMUNITY

## 2022-07-11 RX ORDER — PRAVASTATIN SODIUM 10 MG/1
10 TABLET ORAL NIGHTLY
COMMUNITY
Start: 2022-03-14

## 2022-07-11 RX ORDER — FUROSEMIDE 40 MG/1
40 TABLET ORAL DAILY
COMMUNITY
Start: 2022-03-14

## 2022-07-11 RX ORDER — LISINOPRIL 2.5 MG/1
2.5 TABLET ORAL 2 TIMES DAILY
COMMUNITY
Start: 2022-03-14

## 2022-07-11 NOTE — PROGRESS NOTES
Subjective:    CC: Follow-up (R TKA 7/13/21 STATES HE HAS BEEN DOING GOOD NO COMPLAINTS. )       HPI:  Patient returns today for repeat exam.  Patient is approximately 1 year from his right total knee arthroplasty.  He is very happy with his results.  He is without complaint he denies any new events.    ROS: Refer to HPI for pertinent ROS. All other 12 point systems negative.    Objective:    Physical Exam:  Right lower extremity compartment soft and warm.  Skin is intact.  There is no signs symptoms of DVT or infection.  His incisions well healed his motion is 0-120 degrees he is stable to stressing patella tracked appropriate he is neurovascular intact distally.    Images:  X-rays three views of the right knee demonstrate a well-aligned total knee arthroplasty. Images Reviewed and discussed with patient.    Assessment:  1. Aftercare following right knee joint replacement surgery  - X-Ray Knee Complete 4 Or More Views Right; Future    2. History of total knee replacement, right        Plan:  At this time we discussed his physical exam and x-ray findings.  He has done very well he will continue low-impact activities.  We have discussed GI and  precautions.  Otherwise I would like see him back with any problems or difficulties.    Follow UP: No follow-ups on file.

## 2023-10-29 ENCOUNTER — HOSPITAL ENCOUNTER (EMERGENCY)
Facility: HOSPITAL | Age: 68
Discharge: HOME | End: 2023-10-30
Attending: EMERGENCY MEDICINE
Payer: MEDICARE

## 2023-10-29 DIAGNOSIS — R05.9 COUGH, UNSPECIFIED TYPE: Primary | ICD-10-CM

## 2023-10-29 LAB
ALBUMIN SERPL-MCNC: 3.9 G/DL (ref 3.4–4.8)
ALBUMIN/GLOB SERPL: 1.1 RATIO (ref 1.1–2)
ALP SERPL-CCNC: 78 UNIT/L (ref 40–150)
ALT SERPL-CCNC: 32 UNIT/L (ref 0–55)
AST SERPL-CCNC: 30 UNIT/L (ref 5–34)
BASOPHILS # BLD AUTO: 0.08 X10(3)/MCL
BASOPHILS NFR BLD AUTO: 1 %
BILIRUB SERPL-MCNC: 0.4 MG/DL
BUN SERPL-MCNC: 21.1 MG/DL (ref 8.4–25.7)
CALCIUM SERPL-MCNC: 9.2 MG/DL (ref 8.8–10)
CHLORIDE SERPL-SCNC: 104 MMOL/L (ref 98–107)
CO2 SERPL-SCNC: 25 MMOL/L (ref 23–31)
CREAT SERPL-MCNC: 1.23 MG/DL (ref 0.73–1.18)
EOSINOPHIL # BLD AUTO: 0.37 X10(3)/MCL (ref 0–0.9)
EOSINOPHIL NFR BLD AUTO: 4.5 %
ERYTHROCYTE [DISTWIDTH] IN BLOOD BY AUTOMATED COUNT: 13.1 % (ref 11.5–17)
GFR SERPLBLD CREATININE-BSD FMLA CKD-EPI: >60 MLS/MIN/1.73/M2
GLOBULIN SER-MCNC: 3.7 GM/DL (ref 2.4–3.5)
GLUCOSE SERPL-MCNC: 116 MG/DL (ref 82–115)
HCT VFR BLD AUTO: 42.9 % (ref 42–52)
HGB BLD-MCNC: 14.4 G/DL (ref 14–18)
IMM GRANULOCYTES # BLD AUTO: 0.03 X10(3)/MCL (ref 0–0.04)
IMM GRANULOCYTES NFR BLD AUTO: 0.4 %
LYMPHOCYTES # BLD AUTO: 1.5 X10(3)/MCL (ref 0.6–4.6)
LYMPHOCYTES NFR BLD AUTO: 18.2 %
MCH RBC QN AUTO: 28.6 PG (ref 27–31)
MCHC RBC AUTO-ENTMCNC: 33.6 G/DL (ref 33–36)
MCV RBC AUTO: 85.3 FL (ref 80–94)
MONOCYTES # BLD AUTO: 1.26 X10(3)/MCL (ref 0.1–1.3)
MONOCYTES NFR BLD AUTO: 15.3 %
NEUTROPHILS # BLD AUTO: 5.02 X10(3)/MCL (ref 2.1–9.2)
NEUTROPHILS NFR BLD AUTO: 60.6 %
NRBC BLD AUTO-RTO: 0 %
PLATELET # BLD AUTO: 185 X10(3)/MCL (ref 130–400)
PMV BLD AUTO: 11.4 FL (ref 7.4–10.4)
POTASSIUM SERPL-SCNC: 3.7 MMOL/L (ref 3.5–5.1)
PROT SERPL-MCNC: 7.6 GM/DL (ref 5.8–7.6)
RBC # BLD AUTO: 5.03 X10(6)/MCL (ref 4.7–6.1)
SODIUM SERPL-SCNC: 137 MMOL/L (ref 136–145)
WBC # SPEC AUTO: 8.26 X10(3)/MCL (ref 4.5–11.5)

## 2023-10-29 PROCEDURE — 83880 ASSAY OF NATRIURETIC PEPTIDE: CPT | Performed by: EMERGENCY MEDICINE

## 2023-10-29 PROCEDURE — 84484 ASSAY OF TROPONIN QUANT: CPT | Performed by: EMERGENCY MEDICINE

## 2023-10-29 PROCEDURE — 80053 COMPREHEN METABOLIC PANEL: CPT

## 2023-10-29 PROCEDURE — 85025 COMPLETE CBC W/AUTO DIFF WBC: CPT

## 2023-10-29 PROCEDURE — 99284 EMERGENCY DEPT VISIT MOD MDM: CPT | Mod: 25

## 2023-10-30 VITALS
WEIGHT: 235 LBS | HEART RATE: 60 BPM | OXYGEN SATURATION: 95 % | RESPIRATION RATE: 14 BRPM | DIASTOLIC BLOOD PRESSURE: 72 MMHG | SYSTOLIC BLOOD PRESSURE: 121 MMHG | BODY MASS INDEX: 31.14 KG/M2 | HEIGHT: 73 IN | TEMPERATURE: 98 F

## 2023-10-30 LAB
BNP BLD-MCNC: 28.6 PG/ML
FLUAV AG UPPER RESP QL IA.RAPID: NOT DETECTED
FLUBV AG UPPER RESP QL IA.RAPID: NOT DETECTED
SARS-COV-2 RNA RESP QL NAA+PROBE: NOT DETECTED
TROPONIN I SERPL-MCNC: 0.01 NG/ML (ref 0–0.04)

## 2023-10-30 PROCEDURE — 0240U COVID/FLU A&B PCR: CPT | Performed by: EMERGENCY MEDICINE

## 2023-10-30 RX ORDER — BENZONATATE 100 MG/1
100 CAPSULE ORAL 3 TIMES DAILY PRN
Qty: 20 CAPSULE | Refills: 0 | Status: SHIPPED | OUTPATIENT
Start: 2023-10-30 | End: 2023-11-09

## 2023-10-30 NOTE — DISCHARGE INSTRUCTIONS
Try discontinuing your ACE inhibitor and follow up with your PCP today, try a cool mist humidifier.    Thanks for letting us take care of you today!  It is our goal to give you courteous care and to keep you comfortable and informed, if you have any questions before you leave I will be happy to try and answer them.    Here is some advice after your visit:      Your visit in the emergency department is NOT definitive care - please follow-up with your primary care doctor and/or specialist within 1-2 days.  Please return if you have any worsening in your condition or if you have any other concerns.    If you had radiology exams like an XRAY or CT in the emergency Department the interpreation on them may be preliminary - there may be less time sensitive findings on the reports please obtain these reports within 24 hours from the hospital or by using your out on your mobile phone to access records.  Bring these to your primary care doctor and/or specialist for further review of incidental findings.    Please review any LAB WORK from your visit today with your primary care physician.    If you were prescribed OPIATE PAIN MEDICATION - please understand of these medications can be addictive, you may fill less of the prescription was written for, you do not have to take the full prescription.  You may discard what you do not use.  Please seek help if you feel you are having problems with addiction.  Do not drive or operate heavy machinery if you are taking sedating medications.  Do not mix these medications with alcohol.      If you had a SPLINT placed in the emergency department if you have severe pain numbness tingling or discoloration of year digits please remove the splint and return to the emergency department for further evaluation as this may represent a sign of compromise to the nerves or blood vessels due to swelling.    If you had SUTURES in the emergency department please have them removed in the prescribed time  frame typically within 7-14 days.  You may shower but please do not bathe or swim.  Keep the wounds clean and dry and covered with a clean dressing.  Please return if he have any signs of infection like redness or drainage or pain at the suture site.    Please take the full course of  any ANTIBIOTICS you were prescribed - incomplete courses of antibiotics can cause resistance to antibiotics in the future which will make it difficult to treat any infections you may have.

## 2023-10-30 NOTE — ED PROVIDER NOTES
Encounter Date: 10/29/2023    SCRIBE #1 NOTE: I, Thang Alvarez, am scribing for, and in the presence of,  Chico Garcia MD. I have scribed the following portions of the note - Other sections scribed: HPI,ROS,PE.       History     Chief Complaint   Patient presents with    Cough     Pt reports dry nonproductive cough X 1.5 weeks. Pt reports coughing episodes and SOB when lying flat, hx of sleep apnea. Reports mild relief of cough symptoms with robitussin, last use of OTC med was 2 days ago. Pt does take ace inhibitor.      66 y/o male with PMHx of cardiac pacemaker, CHF, HTN, HLD, and sleep apnea presents to ED c/o cough onset 1.5x weeks. He reports increased SOB when laying down. He denies any fever, leg swelling, or weight gain. He reports possibly having sick contacts. Pt is on an ace inhibitor.     The history is provided by the patient. No  was used.     Review of patient's allergies indicates:   Allergen Reactions    Statins-hmg-coa reductase inhibitors     Adhesive Rash     bandage     Past Medical History:   Diagnosis Date    Acid reflux     Cardiac pacemaker     Contusion of right knee     Heart murmur     HLD (hyperlipidemia)     HTN (hypertension)     Status post total right knee replacement      Past Surgical History:   Procedure Laterality Date    ANGIOPLASTY      CARPAL TUNNEL RELEASE      LAMINECTOMY OF CERVICAL SPINE WITH DECOMPRESSION      MITRAL VALVE REPAIR      Open heart surgery      ORIF Rt ankle      Rt CW pacemaker   2016    TOTAL KNEE ARTHROPLASTY Right 07/13/2021    ulner nerve decompression Bilateral      No family history on file.  Social History     Tobacco Use    Smoking status: Never    Smokeless tobacco: Never     Review of Systems   Constitutional:  Negative for fever and unexpected weight change.   Respiratory:  Positive for cough and shortness of breath (when laying down).    Cardiovascular:  Negative for leg swelling.       Physical Exam     Initial  Vitals [10/29/23 2115]   BP Pulse Resp Temp SpO2   (!) 146/75 68 19 98.2 °F (36.8 °C) 98 %      MAP       --         Physical Exam    Nursing note and vitals reviewed.  Constitutional: He appears well-developed. No distress.   HENT:   Head: Normocephalic and atraumatic.   Mouth/Throat: Oropharynx is clear and moist.   Eyes: Conjunctivae and EOM are normal. Pupils are equal, round, and reactive to light.   Neck: Neck supple.   Cardiovascular:  Normal rate, regular rhythm and normal heart sounds.           No murmur heard.  Pulmonary/Chest: Breath sounds normal. No respiratory distress. He exhibits no tenderness.   Abdominal: Abdomen is soft. Bowel sounds are normal. He exhibits no distension. There is no abdominal tenderness.   Musculoskeletal:         General: Normal range of motion.      Cervical back: Neck supple.      Lumbar back: Normal. No tenderness. Normal range of motion.     Neurological: He is alert and oriented to person, place, and time. He has normal strength. No cranial nerve deficit or sensory deficit.   Psychiatric: He has a normal mood and affect. Judgment normal.         ED Course   Procedures  Labs Reviewed   COMPREHENSIVE METABOLIC PANEL - Abnormal; Notable for the following components:       Result Value    Glucose Level 116 (*)     Creatinine 1.23 (*)     Globulin 3.7 (*)     All other components within normal limits   CBC WITH DIFFERENTIAL - Abnormal; Notable for the following components:    MPV 11.4 (*)     All other components within normal limits   COVID/FLU A&B PCR - Normal    Narrative:     The Xpert Xpress SARS-CoV-2/FLU/RSV plus is a rapid, multiplexed real-time PCR test intended for the simultaneous qualitative detection and differentiation of SARS-CoV-2, Influenza A, Influenza B, and respiratory syncytial virus (RSV) viral RNA in either nasopharyngeal swab or nasal swab specimens.         B-TYPE NATRIURETIC PEPTIDE - Normal   TROPONIN I - Normal   CBC W/ AUTO DIFFERENTIAL     Narrative:     The following orders were created for panel order CBC Auto Differential.  Procedure                               Abnormality         Status                     ---------                               -----------         ------                     CBC with Differential[[id number]]        Abnormal            Final result                 Please view results for these tests on the individual orders.          Imaging Results              X-Ray Chest 1 View (In process)                      Medications - No data to display          Scribe Attestation:   Scribe #1: I performed the above scribed service and the documentation accurately describes the services I performed. I attest to the accuracy of the note.    Attending Attestation:           Physician Attestation for Scribe:  Physician Attestation Statement for Scribe #1: I, Chico Garcia MD, reviewed documentation, as scribed by Thang Alvarez in my presence, and it is both accurate and complete.         Medical Decision Making  The differential diagnosis includes, but is not limited to, PNA, CHF, ace inhibitor induced cough, flu, and COVID.     Amount and/or Complexity of Data Reviewed  Labs: ordered. Decision-making details documented in ED Course.  Radiology: ordered. Decision-making details documented in ED Course.    Risk  Prescription drug management.                            Clinical Impression:   Final diagnoses:  [R05.9] Cough, unspecified type (Primary)        ED Disposition Condition    Discharge Stable          ED Prescriptions       Medication Sig Dispense Start Date End Date Auth. Provider    benzonatate (TESSALON) 100 MG capsule Take 1 capsule (100 mg total) by mouth 3 (three) times daily as needed for Cough (cough). 20 capsule 10/30/2023 11/9/2023 Chico Garcia MD          Follow-up Information       Follow up With Specialties Details Why Contact Info    PCP  Call in 1 day  follow up with PCP ni 1-2 days              Chico Garcia MD  10/30/23 0213

## 2023-10-30 NOTE — FIRST PROVIDER EVALUATION
"Medical screening examination initiated.  I have conducted a focused provider triage encounter, findings are as follows:    Brief history of present illness:  67 year old male presents to the ER for evaluation of worsening cough x 1.5 weeks. Intermittently productive cough. Reports taking OTC Robitussin with mild relief. Reports worsening symptoms and SOB when laying flat, hx of VELIA. Denies any previous lung hx, former smoker- quit 50 years ago. Does not have PCP, has follow up made on 11/8/23 with new PCP. Denies any fever or chills.     Vitals:    10/29/23 2115   BP: (!) 146/75   BP Location: Left arm   Patient Position: Sitting   Pulse: 68   Resp: 19   Temp: 98.2 °F (36.8 °C)   TempSrc: Oral   SpO2: 98%   Weight: 106.6 kg (235 lb)   Height: 6' 1" (1.854 m)       Pertinent physical exam:  alert, dry cough, nonlabored, 98% RA    Brief workup plan:  cxr, labs    Preliminary workup initiated; this workup will be continued and followed by the physician or advanced practice provider that is assigned to the patient when roomed.  "

## 2024-07-26 ENCOUNTER — HOSPITAL ENCOUNTER (EMERGENCY)
Facility: HOSPITAL | Age: 69
Discharge: HOME | End: 2024-07-26
Attending: EMERGENCY MEDICINE
Payer: MEDICARE

## 2024-07-26 VITALS
HEART RATE: 63 BPM | DIASTOLIC BLOOD PRESSURE: 75 MMHG | BODY MASS INDEX: 31.41 KG/M2 | TEMPERATURE: 98 F | RESPIRATION RATE: 20 BRPM | WEIGHT: 237 LBS | SYSTOLIC BLOOD PRESSURE: 127 MMHG | OXYGEN SATURATION: 96 % | HEIGHT: 73 IN

## 2024-07-26 DIAGNOSIS — M79.671 FOOT PAIN, RIGHT: Primary | ICD-10-CM

## 2024-07-26 DIAGNOSIS — M79.671 RIGHT FOOT PAIN: ICD-10-CM

## 2024-07-26 DIAGNOSIS — M19.90 ARTHRITIS: ICD-10-CM

## 2024-07-26 PROCEDURE — 99283 EMERGENCY DEPT VISIT LOW MDM: CPT | Mod: 25

## 2024-07-26 PROCEDURE — 25000003 PHARM REV CODE 250: Performed by: PHYSICIAN ASSISTANT

## 2024-07-26 RX ORDER — HYDROCODONE BITARTRATE AND ACETAMINOPHEN 10; 325 MG/1; MG/1
1 TABLET ORAL
Status: DISCONTINUED | OUTPATIENT
Start: 2024-07-26 | End: 2024-07-26

## 2024-07-26 RX ORDER — OXYCODONE AND ACETAMINOPHEN 5; 325 MG/1; MG/1
1 TABLET ORAL EVERY 6 HOURS PRN
Qty: 12 TABLET | Refills: 0 | Status: SHIPPED | OUTPATIENT
Start: 2024-07-26 | End: 2024-07-29

## 2024-07-26 RX ORDER — OXYCODONE AND ACETAMINOPHEN 10; 325 MG/1; MG/1
1 TABLET ORAL ONCE
Status: COMPLETED | OUTPATIENT
Start: 2024-07-26 | End: 2024-07-26

## 2024-07-26 RX ORDER — OXYCODONE AND ACETAMINOPHEN 5; 325 MG/1; MG/1
2 TABLET ORAL
Status: DISCONTINUED | OUTPATIENT
Start: 2024-07-26 | End: 2024-07-26

## 2024-07-26 RX ORDER — OXYCODONE AND ACETAMINOPHEN 10; 325 MG/1; MG/1
TABLET ORAL
Status: DISPENSED
Start: 2024-07-26 | End: 2024-07-26

## 2024-07-26 RX ADMIN — OXYCODONE HYDROCHLORIDE AND ACETAMINOPHEN 1 TABLET: 10; 325 TABLET ORAL at 11:07

## 2024-07-26 NOTE — FIRST PROVIDER EVALUATION
Medical screening examination initiated.  I have conducted a focused provider triage encounter, findings are as follows:    Brief history of present illness:  68yoM with RIGHT foot pain after stepping hard off step stool a few days ago. History of surgery right foot 15years ago.     There were no vitals filed for this visit.    Pertinent physical exam:  NAD. Ambulates with 1 post walking cane. Point tenderness midfoot - right.     Brief workup plan:  imaging    Preliminary workup initiated; this workup will be continued and followed by the physician or advanced practice provider that is assigned to the patient when roomed.

## 2024-07-26 NOTE — ED PROVIDER NOTES
Encounter Date: 7/26/2024       History     Chief Complaint   Patient presents with    Foot Pain     Pt states he misstepped getting off of a stool x 2 days ago and is now having R foot pain. Swelling to R ankle noted in triage. Denies numbness and tingling     68-year-old white male that presents to the emergency room with right foot pain after fall off step stool.  Patient has had multiple surgeries on the right foot says it was swollen me in a week but after it stepping wrong onto a step stool he is concerned about possible fracture.    The history is provided by the patient.     Review of patient's allergies indicates:   Allergen Reactions    Statins-hmg-coa reductase inhibitors     Adhesive Rash     bandage     Past Medical History:   Diagnosis Date    Acid reflux     Cardiac pacemaker     Contusion of right knee     Heart murmur     HLD (hyperlipidemia)     HTN (hypertension)     Status post total right knee replacement      Past Surgical History:   Procedure Laterality Date    ANGIOPLASTY      CARPAL TUNNEL RELEASE      LAMINECTOMY OF CERVICAL SPINE WITH DECOMPRESSION      MITRAL VALVE REPAIR      Open heart surgery      ORIF Rt ankle      Rt CW pacemaker   2016    TOTAL KNEE ARTHROPLASTY Right 07/13/2021    ulner nerve decompression Bilateral      No family history on file.  Social History     Tobacco Use    Smoking status: Never    Smokeless tobacco: Never     Review of Systems   Constitutional: Negative.  Negative for activity change, appetite change, diaphoresis, fatigue and fever.   HENT:  Negative for rhinorrhea and sinus pressure.    Eyes: Negative.    Respiratory: Negative.  Negative for chest tightness.    Cardiovascular:  Negative for chest pain.   Gastrointestinal: Negative.  Negative for abdominal distention and abdominal pain.   Endocrine: Negative.    Genitourinary: Negative.    Musculoskeletal: Negative.  Negative for arthralgias.   Allergic/Immunologic: Negative.    Neurological:  Negative for  dizziness and headaches.   Hematological: Negative.    Psychiatric/Behavioral: Negative.         Physical Exam     Initial Vitals   BP Pulse Resp Temp SpO2   07/26/24 1102 07/26/24 1100 07/26/24 1100 07/26/24 1100 07/26/24 1100   (!) 140/68 65 18 97.5 °F (36.4 °C) 96 %      MAP       --                Physical Exam    Nursing note and vitals reviewed.  Constitutional: He appears well-developed and well-nourished. He is cooperative. No distress.   HENT:   Head: Normocephalic and atraumatic. Not macrocephalic.   Right Ear: Tympanic membrane normal. Tympanic membrane is not erythematous.   Left Ear: Tympanic membrane normal. Tympanic membrane is not erythematous.   Nose: No mucosal edema. Right sinus exhibits no frontal sinus tenderness. Left sinus exhibits no frontal sinus tenderness.   Mouth/Throat: Mucous membranes are normal. No oropharyngeal exudate.   Eyes: Conjunctivae and EOM are normal. Pupils are equal, round, and reactive to light. Right eye exhibits no discharge. Left eye exhibits no discharge.   Neck: Neck supple. No tracheal deviation present.   Normal range of motion.  Cardiovascular:  Normal rate and regular rhythm.           Pulmonary/Chest: Effort normal and breath sounds normal. He has no decreased breath sounds.   Musculoskeletal:         General: Normal range of motion.      Cervical back: Normal range of motion and neck supple.      Comments: Edema RLE (patient says basleine). Vasculitis bilaterally. Point tenderness right midfoot. No obvious fracture. Skin intact.      Lymphadenopathy:        Head (right side): No submental adenopathy present.        Head (left side): No submental adenopathy present.     He has no cervical adenopathy.   Neurological: He is alert and oriented to person, place, and time. He has normal strength. No cranial nerve deficit. GCS score is 15. GCS eye subscore is 4. GCS verbal subscore is 5. GCS motor subscore is 6.   Skin: Skin is warm.   Psychiatric: He has a normal  mood and affect. His behavior is normal. Judgment and thought content normal.         ED Course   Procedures  Labs Reviewed - No data to display       Imaging Results              X-Ray Foot Complete Right (Final result)  Result time 07/26/24 12:41:04      Final result by Shira Ham MD (07/26/24 12:41:04)                   Impression:      No acute fracture identified.      Electronically signed by: Shira Ham  Date:    07/26/2024  Time:    12:41               Narrative:    EXAMINATION:  XR FOOT COMPLETE 3 VIEW RIGHT    CLINICAL HISTORY:  Pain in right foot    COMPARISON:  None.    FINDINGS:  The toes are flexed which limits their evaluation.  There is no definite acute fracture or malalignment.  There is fusion hardware partially visualized along the distal tibia and distal fibula.  The soft tissues are unremarkable.                                       Medications   oxyCODONE-acetaminophen  mg per tablet 1 tablet (1 tablet Oral Given 7/26/24 4713)     Medical Decision Making  68-year-old white male that presents to the emergency room with right foot pain after fall off step stool.  Patient has had multiple surgeries on the right foot says it was swollen me in a week but after it stepping wrong onto a step stool he is concerned about possible fracture.  Without evidence of acute fracture on x-ray and improving symptoms in the ER feel comfortable patient follow up with Orthopedics at this time.  However for worsening of course he should come back.  Sounds like patient had arthritis and beginning a week in this injury exacerbated that.  Patient feels comfortable with plan.    Problems Addressed:  Right foot pain: chronic illness or injury with exacerbation, progression, or side effects of treatment     Details:   Due to injury.Differential diagnosis included but not limited to:  Foot fracture, hardware fracture, foot sprain    Amount and/or Complexity of Data Reviewed  Radiology: ordered.  Decision-making details documented in ED Course.    Risk  Prescription drug management.                                      Clinical Impression:  Final diagnoses:  [M79.671] Right foot pain  [M79.671] Foot pain, right (Primary)  [M19.90] Arthritis          ED Disposition Condition    Discharge Stable          ED Prescriptions       Medication Sig Dispense Start Date End Date Auth. Provider    oxyCODONE-acetaminophen (PERCOCET) 5-325 mg per tablet Take 1 tablet by mouth every 6 (six) hours as needed for Pain. 12 tablet 7/26/2024 7/29/2024 Kelli Thomas PA          Follow-up Information       Follow up With Specialties Details Why Contact Info    [facility] - Emergency Dept Emergency Medicine  As needed, If symptoms worsen [address] [place] [id number] [phone]    orthopedic                 Kelli Thomas PA  07/26/24 1408

## 2025-01-28 ENCOUNTER — HOSPITAL ENCOUNTER (OUTPATIENT)
Dept: RADIOLOGY | Facility: HOSPITAL | Age: 70
Discharge: HOME | End: 2025-01-28
Payer: MEDICARE

## 2025-01-28 DIAGNOSIS — M25.522 LEFT ELBOW PAIN: ICD-10-CM

## 2025-01-28 PROCEDURE — 73070 X-RAY EXAM OF ELBOW: CPT | Mod: TC,LT

## 2025-02-14 DIAGNOSIS — M25.522 ELBOW PAIN, LEFT: Primary | ICD-10-CM

## 2025-03-13 ENCOUNTER — OFFICE VISIT (OUTPATIENT)
Dept: ORTHOPEDICS | Facility: CLINIC | Age: 70
End: 2025-03-13
Payer: MEDICARE

## 2025-03-13 VITALS
DIASTOLIC BLOOD PRESSURE: 77 MMHG | TEMPERATURE: 98 F | HEIGHT: 73 IN | HEART RATE: 59 BPM | SYSTOLIC BLOOD PRESSURE: 134 MMHG | WEIGHT: 232 LBS | BODY MASS INDEX: 30.75 KG/M2

## 2025-03-13 DIAGNOSIS — M77.12 LEFT LATERAL EPICONDYLITIS: ICD-10-CM

## 2025-03-13 DIAGNOSIS — M25.529 ELBOW PAIN, UNSPECIFIED LATERALITY: Primary | ICD-10-CM

## 2025-03-13 DIAGNOSIS — M25.522 ELBOW PAIN, LEFT: ICD-10-CM

## 2025-03-13 RX ORDER — CELECOXIB 200 MG/1
200 CAPSULE ORAL
COMMUNITY

## 2025-03-13 RX ORDER — HYDROCODONE BITARTRATE AND ACETAMINOPHEN 10; 325 MG/1; MG/1
1 TABLET ORAL 2 TIMES DAILY
COMMUNITY

## 2025-03-13 RX ORDER — BETAMETHASONE SODIUM PHOSPHATE AND BETAMETHASONE ACETATE 3; 3 MG/ML; MG/ML
1.5 INJECTION, SUSPENSION INTRA-ARTICULAR; INTRALESIONAL; INTRAMUSCULAR; SOFT TISSUE
Status: DISCONTINUED | OUTPATIENT
Start: 2025-03-13 | End: 2025-03-13 | Stop reason: HOSPADM

## 2025-03-13 RX ORDER — LIDOCAINE HYDROCHLORIDE 20 MG/ML
2 INJECTION, SOLUTION INFILTRATION; PERINEURAL
Status: DISCONTINUED | OUTPATIENT
Start: 2025-03-13 | End: 2025-03-13 | Stop reason: HOSPADM

## 2025-03-13 RX ADMIN — LIDOCAINE HYDROCHLORIDE 2 ML: 20 INJECTION, SOLUTION INFILTRATION; PERINEURAL at 08:03

## 2025-03-13 RX ADMIN — BETAMETHASONE SODIUM PHOSPHATE AND BETAMETHASONE ACETATE 1.5 MG: 3; 3 INJECTION, SUSPENSION INTRA-ARTICULAR; INTRALESIONAL; INTRAMUSCULAR; SOFT TISSUE at 08:03

## 2025-03-13 NOTE — PROCEDURES
L lateral epicondyleIntermediate Joint Aspiration/Injection    Date/Time: 3/13/2025 8:45 AM    Performed by: Juan C Huggins MD  Authorized by: Juan C Huggins MD    Consent Done?:  Yes (Verbal)  Indications:  Pain  Site marked: The procedure site was marked    Timeout: Prior to procedure the correct patient, procedure, and site was verified      Location:  Elbow  Prep: Patient was prepped and draped in usual sterile fashion    Medications:  2 mL LIDOcaine HCL 20 mg/ml (2%) 20 mg/mL (2 %); 1.5 mg betamethasone acetate-betamethasone sodium phosphate 6 mg/mL  Patient tolerance:  Patient tolerated the procedure well with no immediate complications       Additional Comments: 1.5 cc 2% lidocaine without epi & 1.5 cc betamethasone injected to the point of maximal tenderness about the ECRB.

## 2025-03-13 NOTE — PROGRESS NOTES
"Subjective:    CC: Elbow Pain (Left elbow pain. Patient states pain started in Jan. Pain has progressively gotten worse. Pain worsens when arm is fully extended. He also states that he is dropping things due to the pain.Pain stays localized to elbow.)       History of Present Illness    CHIEF COMPLAINT:  Left elbow pain.    HPI:  Patient presents with left elbow pain that began in January 2025. Pain is localized to a small indent in the elbow with radiation down the forearm. He denies pain going up the arm. Pain is intermittent, typically lasting 45-50 seconds, but was severe enough to wake him from sleep on Friday night, persisting for a few hours. Patient reports, "I woke up and could not tolerate the pain due to its severity." Pain worsens when the arm is straightened.    He has been diagnosed with tennis elbow (lateral epicondylitis) despite not playing tennis. He denies any specific incident that may have caused the injury. Treatment attempts include a brace containing an air pocket, which he reports was ineffective. He is currently taking Celebrex for his ankle, which another doctor suggested might also help with the elbow pain. Patient has previously seen a doctor for this issue and had x-rays taken in January.    He mentions having open heart surgery in 2015, which involved stitching a ring inside his heart to keep the valve in place, limiting his ability to have MRIs.    Patient denies popping, catching, clicking, or locking in the elbow.    PREVIOUS TREATMENTS:  Patient has been using an elbow brace with an air pocket, which has not provided any benefit for his condition.    IMAGING:  X-rays of the elbow were taken in January 2025. The results showed that the bones are fine, indicating that the patient's issue is not related to bone problems.    MEDICATIONS:  Patient is on Celebrex for ankle pain, which is also intended to help with elbow pain. However, it is not providing any benefit for the elbow " issue.    SURGICAL HISTORY:  Patient underwent open heart surgery in 2015. During this procedure, a ring was stitched inside his heart to maintain proper valve function.          ROS: Refer to [facility] for pertinent ROS. All other 12 point systems negative.    Past Medical History:   Diagnosis Date    Acid reflux     Cardiac pacemaker     Contusion of right knee     Heart murmur     HLD (hyperlipidemia)     HTN (hypertension)     Status post total right knee replacement         Past Surgical History:   Procedure Laterality Date    ANGIOPLASTY      CARPAL TUNNEL RELEASE      EXTERNAL EAR SURGERY      LAMINECTOMY OF CERVICAL SPINE WITH DECOMPRESSION      MITRAL VALVE REPAIR      Open heart surgery      ORIF Rt ankle      Rt CW pacemaker   2016    TOTAL KNEE ARTHROPLASTY Right 07/13/2021    ulner nerve decompression Bilateral         Medications Ordered Prior to Encounter[1]     Review of patient's allergies indicates:   Allergen Reactions    Statins-hmg-coa reductase inhibitors     Adhesive Rash     bandage       Objective:    Vitals:    03/13/25 0856   BP: 134/77   Pulse: (!) 59   Temp: 97.6 °F (36.4 °C)        Physical Exam:  Left extremity compartments are soft and warm.  Skin intact,  no signs or symptoms of DVT or infection.  He is point tender along the lateral epicondyle region he does have pain with resisted wrist extension.  Otherwise he has full pronation supination flexion extension at the elbow.  Negative hook sign, he is neurovascular intact distally.    Images:  Outside x-rays were reviewed Images Reviewed and discussed with patient.    Assessment:  1. Elbow pain, left  - Ambulatory referral/consult to Orthopedics    2. Elbow pain, unspecified laterality    3. Left lateral epicondylitis  - Intermediate Joint Aspiration/Injection       Plan:  Assessment & Plan    LATERAL EPICONDYLITIS, LEFT ELBOW:  Administered cortisone injection to the patient's elbow for tennis elbow (lateral epicondylitis).  Printed out  elbow exercises for the patient to perform.  Perform elbow exercises as instructed in handout.  Avoid activities that cause pain.  Will consider CT Elbow if symptoms do not improve by 90% in 3 weeks.    FOLLOW-UP:  Follow up in 3 weeks.          Follow up: No follow-ups on file.    L lateral epicondyleIntermediate Joint Aspiration/Injection    Date/Time: 3/13/2025 8:45 AM    Performed by: Juan C Huggins MD  Authorized by: Juan C Huggins MD    Consent Done?:  Yes (Verbal)  Indications:  Pain  Site marked: The procedure site was marked    Timeout: Prior to procedure the correct patient, procedure, and site was verified      Location:  Elbow  Prep: Patient was prepped and draped in usual sterile fashion    Medications:  2 mL LIDOcaine HCL 20 mg/ml (2%) 20 mg/mL (2 %); 1.5 mg betamethasone acetate-betamethasone sodium phosphate 6 mg/mL  Patient tolerance:  Patient tolerated the procedure well with no immediate complications       Additional Comments: 1.5 cc 2% lidocaine without epi & 1.5 cc betamethasone injected to the point of maximal tenderness about the ECRB.       This note was generated with the assistance of ambient listening technology. Verbal consent was obtained by the patient and accompanying visitor(s) for the recording of patient appointment to facilitate this note. I attest to having reviewed and edited the generated note for accuracy, though some syntax or spelling errors may persist. Please contact the author of this note for any clarification.          [1]   Current Outpatient Medications on File Prior to Visit   Medication Sig Dispense Refill    aspirin (ECOTRIN) 81 MG EC tablet Take 81 mg by mouth.      celecoxib (CELEBREX) 200 MG capsule Take 200 mg by mouth.      furosemide (LASIX) 40 MG tablet Take 40 mg by mouth once daily.      gabapentin (NEURONTIN) 400 MG capsule 400 mg.      lisinopriL (PRINIVIL,ZESTRIL) 2.5 MG tablet Take 2.5 mg by mouth 2 (two) times daily.      metoprolol succinate  (TOPROL-XL) 50 MG 24 hr tablet Take 50 mg by mouth.      pravastatin (PRAVACHOL) 10 MG tablet Take 10 mg by mouth nightly.      warfarin (COUMADIN) 3 MG tablet Take by mouth.      HYDROcodone-acetaminophen (NORCO)  mg per tablet Take 1 tablet by mouth 2 (two) times daily.       No current facility-administered medications on file prior to visit.

## 2025-04-03 ENCOUNTER — OFFICE VISIT (OUTPATIENT)
Dept: ORTHOPEDICS | Facility: CLINIC | Age: 70
End: 2025-04-03
Payer: MEDICARE

## 2025-04-03 VITALS
HEIGHT: 73 IN | WEIGHT: 231.94 LBS | DIASTOLIC BLOOD PRESSURE: 73 MMHG | BODY MASS INDEX: 30.74 KG/M2 | SYSTOLIC BLOOD PRESSURE: 127 MMHG | HEART RATE: 59 BPM

## 2025-04-03 DIAGNOSIS — M77.12 LEFT LATERAL EPICONDYLITIS: Primary | ICD-10-CM

## 2025-04-03 DIAGNOSIS — M66.239: ICD-10-CM

## 2025-04-03 PROCEDURE — 1160F RVW MEDS BY RX/DR IN RCRD: CPT | Mod: CPTII,,, | Performed by: ORTHOPAEDIC SURGERY

## 2025-04-03 PROCEDURE — 99213 OFFICE O/P EST LOW 20 MIN: CPT | Mod: ,,, | Performed by: ORTHOPAEDIC SURGERY

## 2025-04-03 PROCEDURE — 3008F BODY MASS INDEX DOCD: CPT | Mod: CPTII,,, | Performed by: ORTHOPAEDIC SURGERY

## 2025-04-03 PROCEDURE — 1159F MED LIST DOCD IN RCRD: CPT | Mod: CPTII,,, | Performed by: ORTHOPAEDIC SURGERY

## 2025-04-03 PROCEDURE — 3074F SYST BP LT 130 MM HG: CPT | Mod: CPTII,,, | Performed by: ORTHOPAEDIC SURGERY

## 2025-04-03 PROCEDURE — 3078F DIAST BP <80 MM HG: CPT | Mod: CPTII,,, | Performed by: ORTHOPAEDIC SURGERY

## 2025-04-03 RX ORDER — METHYLPREDNISOLONE 4 MG/1
TABLET ORAL
Qty: 1 EACH | Refills: 0 | Status: SHIPPED | OUTPATIENT
Start: 2025-04-03

## 2025-04-03 NOTE — PROGRESS NOTES
"Subjective:    CC: Follow-up of the Left Elbow (F/U L elbow inj 3/13/25. Pt states inj lasted for a day. Elbow feels like it's gotten worse. No new concerns with it. )       HPI:  Patient returns today for repeat exam.  Patient states he continues to have severe pain on the outside part of his left elbow.  He has had a previous injection, has tried the elbow strap without relief.  He states it has gone on for the last 5+ months.  Not improving.    ROS: Refer to HPI for pertinent ROS. All other 12 point systems negative.    Objective:  Vitals:    04/03/25 0942   BP: 127/73   BP Location: Right arm   Patient Position: Sitting   Pulse: (!) 59   Weight: 105.2 kg (231 lb 14.8 oz)   Height: 6' 1" (1.854 m)        Physical Exam:  Left upper extremity compartment soft and warm.  Skin is intact.  There is no signs symptoms of DVT or infection.  He remains be point tender along the lateral aspect of the left elbow over the epicondyle region.  He does have pain with resisted wrist extension.  He has appropriate pronation supination flexion extension otherwise stable to stressing negative Tinel's negative hook sign, neurovascular intact distally.    Images: . Images Reviewed and discussed with patient.    Assessment:  1. Left lateral epicondylitis    2. Nontraumatic rupture of extensor tendon of forearm        Plan:  At this time we discussed his physical exam and previous imaging findings.  Patient remains be symptomatic about his tennis elbow.  We are not able to obtain an MRI, we will proceed with an ultrasound of his elbow I do suspect a rupture, I would like see him back next week with his results.  In the meantime we have discussed a Medrol Dosepak    Follow UP: No follow-ups on file.              "

## 2025-04-17 ENCOUNTER — OFFICE VISIT (OUTPATIENT)
Dept: ORTHOPEDICS | Facility: CLINIC | Age: 70
End: 2025-04-17
Payer: MEDICARE

## 2025-04-17 ENCOUNTER — HOSPITAL ENCOUNTER (OUTPATIENT)
Dept: RADIOLOGY | Facility: HOSPITAL | Age: 70
Discharge: HOME | End: 2025-04-17
Attending: ORTHOPAEDIC SURGERY
Payer: MEDICARE

## 2025-04-17 VITALS
SYSTOLIC BLOOD PRESSURE: 131 MMHG | BODY MASS INDEX: 30.74 KG/M2 | HEART RATE: 60 BPM | WEIGHT: 231.94 LBS | DIASTOLIC BLOOD PRESSURE: 78 MMHG | HEIGHT: 73 IN

## 2025-04-17 DIAGNOSIS — M77.12 LEFT LATERAL EPICONDYLITIS: ICD-10-CM

## 2025-04-17 DIAGNOSIS — M77.12 LEFT LATERAL EPICONDYLITIS: Primary | ICD-10-CM

## 2025-04-17 DIAGNOSIS — M77.9 TENDONITIS: ICD-10-CM

## 2025-04-17 DIAGNOSIS — M66.239: ICD-10-CM

## 2025-04-17 PROCEDURE — 76882 US LMTD JT/FCL EVL NVASC XTR: CPT | Mod: TC,LT

## 2025-04-17 PROCEDURE — 73080 X-RAY EXAM OF ELBOW: CPT | Mod: TC,LT

## 2025-04-17 RX ADMIN — LIDOCAINE HYDROCHLORIDE 2 ML: 20 INJECTION, SOLUTION INFILTRATION; PERINEURAL at 02:04

## 2025-04-17 RX ADMIN — BETAMETHASONE SODIUM PHOSPHATE AND BETAMETHASONE ACETATE 1.5 MG: 3; 3 INJECTION, SUSPENSION INTRA-ARTICULAR; INTRALESIONAL; INTRAMUSCULAR; SOFT TISSUE at 02:04

## 2025-04-17 NOTE — PROGRESS NOTES
"Subjective:    CC: Results of the Left Elbow (L elbow US results. Pt states elbow is doing the same. No new concerns with it.)       HPI:  Patient returns today for repeat exam.  Patient had an ultrasound of his left elbow.  We have discussed his results.  Continues to have pain intermittently, shocking along the lateral aspect.    ROS: Refer to HPI for pertinent ROS. All other 12 point systems negative.    Objective:  Vitals:    04/17/25 1503   BP: 131/78   BP Location: Right arm   Patient Position: Sitting   Pulse: 60   Weight: 105.2 kg (231 lb 14.8 oz)   Height: 6' 1" (1.854 m)        Physical Exam:  Left upper extremity compartment soft and warm.  Skin is intact.  There is no signs symptoms of DVT or infection.  He is point tender along the lateral epicondyle region.  He has full pronation supination flexion extension he is stable to stressing negative Tinel's.  He does have pain with resisted wrist extension over the lateral epicondyle region, he is neurovascular intact distally.    Images:  Ultrasound left elbow was reviewed. Images Reviewed and discussed with patient.    Assessment:  1. Left lateral epicondylitis  - Intermediate Joint Aspiration/Injection    2. Tendonitis  - Intermediate Joint Aspiration/Injection        Plan:  At this time we discussed his physical exam and ultrasound results.  We will continue conservative treatment discussed bracing, refraining from any strenuous activity for the left elbow, he tolerated the steroid injection very well to the point with a maximal tenderness along the lateral epicondyle region.  I would like see back in 4 weeks to see how he is progressing.    Follow UP: No follow-ups on file.    L lateral epicondyleIntermediate Joint Aspiration/Injection    Date/Time: 4/17/2025 2:30 PM    Performed by: Juan C Huggins MD  Authorized by: Juan C Huggins MD    Consent Done?:  Yes (Verbal)  Indications:  Pain  Site marked: The procedure site was marked    Timeout: Prior to " procedure the correct patient, procedure, and site was verified      Location:  Elbow  Prep: Patient was prepped and draped in usual sterile fashion    Medications:  2 mL LIDOcaine HCL 20 mg/ml (2%) 20 mg/mL (2 %); 1.5 mg betamethasone acetate-betamethasone sodium phosphate 6 mg/mL  Patient tolerance:  Patient tolerated the procedure well with no immediate complications       Additional Comments: 1.5 cc 2% lidocaine without epi & 1.5 cc betamethasone injected to the point of maximal tenderness about the ECRB.

## 2025-04-17 NOTE — PROCEDURES
L lateral epicondyleIntermediate Joint Aspiration/Injection    Date/Time: 4/17/2025 2:30 PM    Performed by: Juan C Huggins MD  Authorized by: Juan C Huggins MD    Consent Done?:  Yes (Verbal)  Indications:  Pain  Site marked: The procedure site was marked    Timeout: Prior to procedure the correct patient, procedure, and site was verified      Location:  Elbow  Prep: Patient was prepped and draped in usual sterile fashion    Medications:  2 mL LIDOcaine HCL 20 mg/ml (2%) 20 mg/mL (2 %); 1.5 mg betamethasone acetate-betamethasone sodium phosphate 6 mg/mL  Patient tolerance:  Patient tolerated the procedure well with no immediate complications       Additional Comments: 1.5 cc 2% lidocaine without epi & 1.5 cc betamethasone injected to the point of maximal tenderness about the ECRB.

## 2025-04-18 RX ORDER — BETAMETHASONE SODIUM PHOSPHATE AND BETAMETHASONE ACETATE 3; 3 MG/ML; MG/ML
1.5 INJECTION, SUSPENSION INTRA-ARTICULAR; INTRALESIONAL; INTRAMUSCULAR; SOFT TISSUE
Status: DISCONTINUED | OUTPATIENT
Start: 2025-04-17 | End: 2025-04-18 | Stop reason: HOSPADM

## 2025-04-18 RX ORDER — LIDOCAINE HYDROCHLORIDE 20 MG/ML
2 INJECTION, SOLUTION INFILTRATION; PERINEURAL
Status: DISCONTINUED | OUTPATIENT
Start: 2025-04-17 | End: 2025-04-18 | Stop reason: HOSPADM

## 2025-05-29 ENCOUNTER — OFFICE VISIT (OUTPATIENT)
Dept: ORTHOPEDICS | Facility: CLINIC | Age: 70
End: 2025-05-29
Payer: MEDICARE

## 2025-05-29 ENCOUNTER — HOSPITAL ENCOUNTER (OUTPATIENT)
Dept: RADIOLOGY | Facility: CLINIC | Age: 70
Discharge: HOME | End: 2025-05-29
Attending: ORTHOPAEDIC SURGERY
Payer: MEDICARE

## 2025-05-29 VITALS
HEIGHT: 73 IN | SYSTOLIC BLOOD PRESSURE: 104 MMHG | DIASTOLIC BLOOD PRESSURE: 65 MMHG | BODY MASS INDEX: 30.74 KG/M2 | HEART RATE: 62 BPM | WEIGHT: 231.94 LBS

## 2025-05-29 DIAGNOSIS — R10.2 PAIN IN PELVIS: ICD-10-CM

## 2025-05-29 DIAGNOSIS — M77.12 LEFT LATERAL EPICONDYLITIS: Primary | ICD-10-CM

## 2025-05-29 DIAGNOSIS — M46.1 SI JOINT ARTHRITIS: ICD-10-CM

## 2025-05-29 PROCEDURE — 72190 X-RAY EXAM OF PELVIS: CPT | Mod: ,,, | Performed by: ORTHOPAEDIC SURGERY

## 2025-05-29 PROCEDURE — 99214 OFFICE O/P EST MOD 30 MIN: CPT | Mod: ,,, | Performed by: ORTHOPAEDIC SURGERY

## 2025-05-29 PROCEDURE — 3008F BODY MASS INDEX DOCD: CPT | Mod: CPTII,,, | Performed by: ORTHOPAEDIC SURGERY

## 2025-05-29 PROCEDURE — 1101F PT FALLS ASSESS-DOCD LE1/YR: CPT | Mod: CPTII,,, | Performed by: ORTHOPAEDIC SURGERY

## 2025-05-29 PROCEDURE — 3288F FALL RISK ASSESSMENT DOCD: CPT | Mod: CPTII,,, | Performed by: ORTHOPAEDIC SURGERY

## 2025-05-29 PROCEDURE — 1160F RVW MEDS BY RX/DR IN RCRD: CPT | Mod: CPTII,,, | Performed by: ORTHOPAEDIC SURGERY

## 2025-05-29 PROCEDURE — 1159F MED LIST DOCD IN RCRD: CPT | Mod: CPTII,,, | Performed by: ORTHOPAEDIC SURGERY

## 2025-05-29 PROCEDURE — 3078F DIAST BP <80 MM HG: CPT | Mod: CPTII,,, | Performed by: ORTHOPAEDIC SURGERY

## 2025-05-29 PROCEDURE — 3074F SYST BP LT 130 MM HG: CPT | Mod: CPTII,,, | Performed by: ORTHOPAEDIC SURGERY

## 2025-05-29 PROCEDURE — 4010F ACE/ARB THERAPY RXD/TAKEN: CPT | Mod: CPTII,,, | Performed by: ORTHOPAEDIC SURGERY

## 2025-07-24 ENCOUNTER — OFFICE VISIT (OUTPATIENT)
Dept: ORTHOPEDICS | Facility: CLINIC | Age: 70
End: 2025-07-24
Payer: MEDICARE

## 2025-07-24 DIAGNOSIS — M77.12 LEFT LATERAL EPICONDYLITIS: Primary | ICD-10-CM

## 2025-07-24 DIAGNOSIS — M46.1 SI JOINT ARTHRITIS: ICD-10-CM

## 2025-07-24 PROCEDURE — 99213 OFFICE O/P EST LOW 20 MIN: CPT | Mod: ,,, | Performed by: ORTHOPAEDIC SURGERY

## 2025-07-24 PROCEDURE — 1159F MED LIST DOCD IN RCRD: CPT | Mod: CPTII,,, | Performed by: ORTHOPAEDIC SURGERY

## 2025-07-24 PROCEDURE — 3288F FALL RISK ASSESSMENT DOCD: CPT | Mod: CPTII,,, | Performed by: ORTHOPAEDIC SURGERY

## 2025-07-24 PROCEDURE — 1160F RVW MEDS BY RX/DR IN RCRD: CPT | Mod: CPTII,,, | Performed by: ORTHOPAEDIC SURGERY

## 2025-07-24 PROCEDURE — 4010F ACE/ARB THERAPY RXD/TAKEN: CPT | Mod: CPTII,,, | Performed by: ORTHOPAEDIC SURGERY

## 2025-07-24 PROCEDURE — 1101F PT FALLS ASSESS-DOCD LE1/YR: CPT | Mod: CPTII,,, | Performed by: ORTHOPAEDIC SURGERY

## 2025-07-31 NOTE — PROGRESS NOTES
Subjective:    CC: Follow-up and Injections of the Left Elbow (F/U L elbow inj 4/17/25. Pt states elbow is doing great. Inj gave a lot of relief. Feels like elbow is getting better. Not having any pain. No other concerns with it.) and Follow-up of the Left Hip (F/U SI joint pain. Pt states pain has increased a lot. Having a lot of pain. Having to take pain meds now. No numbness or tingling. Had to stop PT after 5-6 sessions due to hernia repair. No new concerns with it.)       HPI:  Patient returns today for repeat exam.  Patient states his left elbow is doing much better now.  Patient's main complaint is his SI joint.  He continues to have pain in the right hip region.    ROS: Refer to [facility] for pertinent ROS. All other 12 point systems negative.    Objective:  There were no vitals filed for this visit.     Physical Exam:  Left upper extremity compartment soft and warm.  Skin is intact.  There is no signs symptoms of DVT or infection.  He has very minimal if any tenderness anymore along the lateral aspect of the left elbow he has appropriate motion stable to stressing, neurovascular intact distally.  Examination of the right hip remains be tender along the posterior aspect, over the SI joint of the right pelvic region.  He has no pain with log-rolling of the right hip [facility] stable to stressing, no obvious long track signs, neurovascular intact distally.    Images: . Images Reviewed and discussed with patient.    Assessment:  1. Left lateral epicondylitis    2. SI joint arthritis        Plan:  At this time we discussed his physical exam and previous imaging findings.  We have discussed an SI injection, he is interested in this, we have discussed ultrasound guidance, we will set this up at his convenience.    Follow UP: No follow-ups on file.

## 2025-08-05 ENCOUNTER — OFFICE VISIT (OUTPATIENT)
Dept: ORTHOPEDICS | Facility: CLINIC | Age: 70
End: 2025-08-05
Payer: MEDICARE

## 2025-08-05 DIAGNOSIS — M46.1 SI JOINT ARTHRITIS: Primary | ICD-10-CM

## 2025-08-05 PROCEDURE — 20610 DRAIN/INJ JOINT/BURSA W/O US: CPT | Mod: RT,,, | Performed by: FAMILY MEDICINE

## 2025-08-05 PROCEDURE — 99499 UNLISTED E&M SERVICE: CPT | Mod: ,,, | Performed by: FAMILY MEDICINE

## 2025-08-05 RX ORDER — BETAMETHASONE SODIUM PHOSPHATE AND BETAMETHASONE ACETATE 3; 3 MG/ML; MG/ML
12 INJECTION, SUSPENSION INTRA-ARTICULAR; INTRALESIONAL; INTRAMUSCULAR; SOFT TISSUE
Status: DISCONTINUED | OUTPATIENT
Start: 2025-08-05 | End: 2025-08-05 | Stop reason: HOSPADM

## 2025-08-05 RX ORDER — LIDOCAINE HYDROCHLORIDE 10 MG/ML
2 INJECTION, SOLUTION INFILTRATION; PERINEURAL
Status: DISCONTINUED | OUTPATIENT
Start: 2025-08-05 | End: 2025-08-05 | Stop reason: HOSPADM

## 2025-08-05 RX ADMIN — LIDOCAINE HYDROCHLORIDE 2 ML: 10 INJECTION, SOLUTION INFILTRATION; PERINEURAL at 03:08

## 2025-08-05 RX ADMIN — BETAMETHASONE SODIUM PHOSPHATE AND BETAMETHASONE ACETATE 12 MG: 3; 3 INJECTION, SUSPENSION INTRA-ARTICULAR; INTRALESIONAL; INTRAMUSCULAR; SOFT TISSUE at 03:08

## 2025-08-05 NOTE — PROGRESS NOTES
[facility]  Macario Ferraro MD    Patient ID: [id number]     Chief Complaint: Injections (Evaluate for SI joint US guided steroid injection )      HPI:     Amandeep Garcia is a 69 y.o. male here today for the right SI joint injection.  He has right-sided low back pain that is chronic.    Past Medical History:   Diagnosis Date    Acid reflux     Cardiac pacemaker     Contusion of right knee     Heart murmur     HLD (hyperlipidemia)     HTN (hypertension)     Status post total right knee replacement         Past Surgical History:   Procedure Laterality Date    ANGIOPLASTY      CARPAL TUNNEL RELEASE      EXTERNAL EAR SURGERY      LAMINECTOMY OF CERVICAL SPINE WITH DECOMPRESSION      MITRAL VALVE REPAIR      Open heart surgery      ORIF Rt ankle      Rt CW pacemaker   2016    TOTAL KNEE ARTHROPLASTY Right 07/13/2021    ulner nerve decompression Bilateral         Social History     Tobacco Use    Smoking status: Never    Smokeless tobacco: Never   Substance and Sexual Activity    Alcohol use: Yes     Comment: ocassionaly    Drug use: Not on file    Sexual activity: Not on file        Current Outpatient Medications   Medication Instructions    aspirin (ECOTRIN) 81 mg    celecoxib (CELEBREX) 200 mg    furosemide (LASIX) 40 mg, Daily    gabapentin (NEURONTIN) 400 mg    HYDROcodone-acetaminophen (NORCO)  mg per tablet 1 tablet, 2 times daily    lisinopriL (PRINIVIL,ZESTRIL) 2.5 mg, 2 times daily    methylPREDNISolone (MEDROL DOSEPACK) 4 mg tablet use as directed    metoprolol succinate (TOPROL-XL) 50 mg    pravastatin (PRAVACHOL) 10 mg, Nightly    warfarin (COUMADIN) 3 MG tablet Take by mouth.       Review of patient's allergies indicates:   Allergen Reactions    Statins-hmg-coa reductase inhibitors     Adhesive Rash     bandage        Patient Care Team:  No, Primary Doctor as PCP - General     Subjective:     Review of Systems    12 point review of systems conducted, negative except as stated in the  history of present illness. See HPI for details.    Objective:     There were no vitals taken for this visit.    Physical Exam  Hands Free examination:  Patient is awake, alert and in no acute distress.  Respirations are non labored.  No abdominal distension is noted.  No visible rashes on exposed skin.  No lower extremity edema is present.    Imaging Reviewed:   AP pelvis shows mild degenerative changes of the bilateral hip joints and mild degenerative changes of the SI joints bilaterally, diffusely severe degenerative disc space narrowing at multiple levels in the lumbar spine.    Assessment:   Right SI joint pain  Grade 1 lumbar spondylolisthesis L5-S1  Lumbar spondylosis  Degenerative disc disease lumbar spine    Plan:     -Today after discussion of the risks and benefits patient elected to proceed with a right SI joint injection with ultrasound guidance.  We completed this procedure in office today without complication.  Care after injection practices were discussed.  -We discussed that if this resolves all of his symptoms I will be happy to see him back for injections in the SI joint up to every 3 months as needed.  -If he has no real relief he understands that his symptoms are likely referred from arthritis in his lumbar spine.  -I will see him back for the SI joint pain as needed.    Macario Ferraro MD  [facility]

## 2025-08-05 NOTE — PROCEDURES
Large Joint Aspiration/Injection    Date/Time: 8/5/2025 3:00 PM    Performed by: Macario Ferraro MD  Authorized by: Macario Ferraro MD    Site marked: the procedure site was marked    Timeout: prior to procedure the correct patient, procedure, and site was verified    Prep: patient was prepped and draped in usual sterile fashion    Local anesthesia used?: No      Details:  Needle Size:  22 G  Ultrasonic Guidance for needle placement?: Yes    Images are saved and documented.  Approach:  Posterior  Location: right SI joint.  Medications:  2 mL LIDOcaine HCL 10 mg/ml (1%) 10 mg/mL (1 %); 12 mg betamethasone acetate-betamethasone sodium phosphate 6 mg/mL  Patient tolerance:  Patient tolerated the procedure well with no immediate complications

## 2025-08-19 ENCOUNTER — HOSPITAL ENCOUNTER (OUTPATIENT)
Dept: RADIOLOGY | Facility: CLINIC | Age: 70
Discharge: HOME | End: 2025-08-19
Attending: FAMILY MEDICINE
Payer: MEDICARE

## 2025-08-19 ENCOUNTER — OFFICE VISIT (OUTPATIENT)
Dept: ORTHOPEDICS | Facility: CLINIC | Age: 70
End: 2025-08-19
Payer: MEDICARE

## 2025-08-19 VITALS
HEIGHT: 73 IN | HEART RATE: 60 BPM | WEIGHT: 229.75 LBS | BODY MASS INDEX: 30.45 KG/M2 | SYSTOLIC BLOOD PRESSURE: 148 MMHG | DIASTOLIC BLOOD PRESSURE: 85 MMHG

## 2025-08-19 DIAGNOSIS — M54.9 DORSALGIA, UNSPECIFIED: ICD-10-CM

## 2025-08-19 DIAGNOSIS — M54.50 LUMBAR SPINE PAIN: Primary | ICD-10-CM

## 2025-08-19 DIAGNOSIS — M54.50 LUMBAR SPINE PAIN: ICD-10-CM

## 2025-08-19 PROCEDURE — 3008F BODY MASS INDEX DOCD: CPT | Mod: CPTII,,, | Performed by: FAMILY MEDICINE

## 2025-08-19 PROCEDURE — 3077F SYST BP >= 140 MM HG: CPT | Mod: CPTII,,, | Performed by: FAMILY MEDICINE

## 2025-08-19 PROCEDURE — 3288F FALL RISK ASSESSMENT DOCD: CPT | Mod: CPTII,,, | Performed by: FAMILY MEDICINE

## 2025-08-19 PROCEDURE — 4010F ACE/ARB THERAPY RXD/TAKEN: CPT | Mod: CPTII,,, | Performed by: FAMILY MEDICINE

## 2025-08-19 PROCEDURE — 72100 X-RAY EXAM L-S SPINE 2/3 VWS: CPT | Mod: ,,, | Performed by: FAMILY MEDICINE

## 2025-08-19 PROCEDURE — 1101F PT FALLS ASSESS-DOCD LE1/YR: CPT | Mod: CPTII,,, | Performed by: FAMILY MEDICINE

## 2025-08-19 PROCEDURE — 3079F DIAST BP 80-89 MM HG: CPT | Mod: CPTII,,, | Performed by: FAMILY MEDICINE

## 2025-08-19 PROCEDURE — 99213 OFFICE O/P EST LOW 20 MIN: CPT | Mod: ,,, | Performed by: FAMILY MEDICINE

## 2025-08-19 PROCEDURE — 1159F MED LIST DOCD IN RCRD: CPT | Mod: CPTII,,, | Performed by: FAMILY MEDICINE

## 2025-08-19 PROCEDURE — 1160F RVW MEDS BY RX/DR IN RCRD: CPT | Mod: CPTII,,, | Performed by: FAMILY MEDICINE
